# Patient Record
Sex: MALE | Race: WHITE | Employment: FULL TIME | URBAN - METROPOLITAN AREA
[De-identification: names, ages, dates, MRNs, and addresses within clinical notes are randomized per-mention and may not be internally consistent; named-entity substitution may affect disease eponyms.]

---

## 2021-10-07 ENCOUNTER — OFFICE VISIT (OUTPATIENT)
Dept: PRIMARY CARE CLINIC | Age: 56
End: 2021-10-07
Payer: COMMERCIAL

## 2021-10-07 VITALS
DIASTOLIC BLOOD PRESSURE: 84 MMHG | RESPIRATION RATE: 18 BRPM | HEART RATE: 80 BPM | SYSTOLIC BLOOD PRESSURE: 136 MMHG | BODY MASS INDEX: 24.52 KG/M2 | OXYGEN SATURATION: 97 % | WEIGHT: 181 LBS | HEIGHT: 72 IN

## 2021-10-07 DIAGNOSIS — M54.41 CHRONIC MIDLINE LOW BACK PAIN WITH RIGHT-SIDED SCIATICA: Primary | ICD-10-CM

## 2021-10-07 DIAGNOSIS — G89.29 CHRONIC MIDLINE LOW BACK PAIN WITH RIGHT-SIDED SCIATICA: Primary | ICD-10-CM

## 2021-10-07 DIAGNOSIS — Z12.11 COLON CANCER SCREENING: ICD-10-CM

## 2021-10-07 DIAGNOSIS — Z00.00 ANNUAL PHYSICAL EXAM: ICD-10-CM

## 2021-10-07 PROCEDURE — 99202 OFFICE O/P NEW SF 15 MIN: CPT | Performed by: STUDENT IN AN ORGANIZED HEALTH CARE EDUCATION/TRAINING PROGRAM

## 2021-10-07 RX ORDER — PREDNISONE 20 MG/1
20 TABLET ORAL DAILY
Qty: 5 TABLET | Refills: 0 | Status: SHIPPED | OUTPATIENT
Start: 2021-10-07 | End: 2021-10-12

## 2021-10-07 RX ORDER — LIDOCAINE 50 MG/G
1 PATCH TOPICAL DAILY
Qty: 30 PATCH | Refills: 0 | Status: SHIPPED | OUTPATIENT
Start: 2021-10-07 | End: 2021-11-06

## 2021-10-07 SDOH — ECONOMIC STABILITY: FOOD INSECURITY: WITHIN THE PAST 12 MONTHS, THE FOOD YOU BOUGHT JUST DIDN'T LAST AND YOU DIDN'T HAVE MONEY TO GET MORE.: NEVER TRUE

## 2021-10-07 SDOH — ECONOMIC STABILITY: FOOD INSECURITY: WITHIN THE PAST 12 MONTHS, YOU WORRIED THAT YOUR FOOD WOULD RUN OUT BEFORE YOU GOT MONEY TO BUY MORE.: NEVER TRUE

## 2021-10-07 ASSESSMENT — PATIENT HEALTH QUESTIONNAIRE - PHQ9
SUM OF ALL RESPONSES TO PHQ QUESTIONS 1-9: 1
2. FEELING DOWN, DEPRESSED OR HOPELESS: 1
1. LITTLE INTEREST OR PLEASURE IN DOING THINGS: 0
SUM OF ALL RESPONSES TO PHQ9 QUESTIONS 1 & 2: 1
SUM OF ALL RESPONSES TO PHQ QUESTIONS 1-9: 1
SUM OF ALL RESPONSES TO PHQ QUESTIONS 1-9: 1

## 2021-10-07 ASSESSMENT — ENCOUNTER SYMPTOMS
COUGH: 0
ABDOMINAL DISTENTION: 0
SHORTNESS OF BREATH: 0
EYE DISCHARGE: 0
ABDOMINAL PAIN: 0
RHINORRHEA: 0
BACK PAIN: 1
WHEEZING: 0
EYE ITCHING: 0

## 2021-10-07 ASSESSMENT — SOCIAL DETERMINANTS OF HEALTH (SDOH): HOW HARD IS IT FOR YOU TO PAY FOR THE VERY BASICS LIKE FOOD, HOUSING, MEDICAL CARE, AND HEATING?: NOT HARD AT ALL

## 2021-10-07 NOTE — PROGRESS NOTES
516 \[facility] [place] [facility]\"" PRIMARY CARE  [facility] [address] [facility] [address]  [address] [address]  Dept: [phone]  Dept Fax: [phone]    Betzy Gil is a 63 y.o. male who presents today for his medical conditions/complaints as noted below. Chief Complaint   Patient presents with    New Patient    Leg Pain     right        HPI:     63 y. o. M presented to office today to establish care. His main concern was chronic lower back pain that has gotten worse now and radiates from his buttock to right leg. He denied any numbness tingling, recent trauma, recent URI, weakness and change in bowel bladder habits. He mentioned that he had a back surgery done many years ago after which his symptoms were better. He denied any other chronic medical issues. No other current complaints. Vital signs stable. He is a current everyday smoker. He declined low-dose CT lung screening for lung cancer. Advised lifestyle changes. Medications reviewed and refilled as appropriate, problem list updated. All concerns discussed in details and all questions answered to patient satisfaction. No results found for: LABA1C          ( goal A1C is < 7)   No results found for: LABMICR  No results found for: LDLCHOLESTEROL, LDLCALC    (goal LDL is <100)   No results found for: AST, ALT, BUN  BP Readings from Last 3 Encounters:   10/07/21 136/84          (goal 120/80)    History reviewed. No pertinent past medical history. Past Surgical History:   Procedure Laterality Date    ANTERIOR CRUCIATE LIGAMENT REPAIR Right     SPINAL FUSION         History reviewed. No pertinent family history.     Social History     Tobacco Use    Smoking status: Current Every Day Smoker     Packs/day: 1.00     Years: 40.00     Pack years: 40.00     Types: Cigarettes    Smokeless tobacco: Never Used   Substance Use Topics    Alcohol use: Yes     Comment: occaional       Current Outpatient Medications Medication Sig Dispense Refill    predniSONE (DELTASONE) 20 MG tablet Take 1 tablet by mouth daily for 5 days 5 tablet 0    lidocaine (LIDODERM) 5 % Place 1 patch onto the skin daily 12 hours on, 12 hours off. 30 patch 0     No current facility-administered medications for this visit. No Known Allergies    Health Maintenance   Topic Date Due    Hepatitis C screen  Never done    HIV screen  Never done    DTaP/Tdap/Td vaccine (1 - Tdap) Never done    Lipid screen  Never done    Colon cancer screen colonoscopy  Never done    Shingles Vaccine (1 of 2) Never done    Low dose CT lung screening  Never done    Flu vaccine (1) 10/07/2022 (Originally 9/1/2021)    COVID-19 Vaccine  Completed    Hepatitis A vaccine  Aged Out    Hepatitis B vaccine  Aged Out    Hib vaccine  Aged Out    Meningococcal (ACWY) vaccine  Aged Out    Pneumococcal 0-64 years Vaccine  Aged Out       Subjective:      Review of Systems   Constitutional: Negative for chills, fatigue and fever. HENT: Negative for congestion, hearing loss and rhinorrhea. Eyes: Negative for discharge and itching. Respiratory: Negative for cough, shortness of breath and wheezing. Cardiovascular: Negative for chest pain, palpitations and leg swelling. Gastrointestinal: Negative for abdominal distention and abdominal pain. Endocrine: Negative for polydipsia and polyphagia. Genitourinary: Negative for difficulty urinating and dysuria. Musculoskeletal: Positive for back pain. Negative for arthralgias. Skin: Negative for rash and wound. Neurological: Negative for dizziness, seizures, light-headedness, numbness and headaches. Psychiatric/Behavioral: Negative for agitation and behavioral problems. Objective:     Physical Exam  Constitutional:       Appearance: He is well-developed. HENT:      Head: Normocephalic and atraumatic.    Eyes:      Conjunctiva/sclera: Conjunctivae normal.      Pupils: Pupils are equal, round, and reactive to light. Neck:      Thyroid: No thyromegaly. Vascular: No JVD. Cardiovascular:      Rate and Rhythm: Normal rate and regular rhythm. Heart sounds: Normal heart sounds. No murmur heard. Pulmonary:      Effort: Pulmonary effort is normal.      Breath sounds: Normal breath sounds. No wheezing. Abdominal:      General: Bowel sounds are normal. There is no distension. Palpations: Abdomen is soft. Tenderness: There is no abdominal tenderness. There is no rebound. Musculoskeletal:         General: Tenderness present. Cervical back: Normal range of motion and neck supple. Comments: Lower midline back tenderness present. Restricted range of motion present. Neurological:      Mental Status: He is alert and oriented to person, place, and time. Cranial Nerves: No cranial nerve deficit. Psychiatric:         Behavior: Behavior normal.       /84   Pulse 80   Resp 18   Ht 6' (1.829 m)   Wt 181 lb (82.1 kg)   SpO2 97%   BMI 24.55 kg/m²     Assessment:          1. Chronic midline low back pain with right-sided sciatica    - MRI LUMBAR SPINE W CONTRAST; Future  - predniSONE (DELTASONE) 20 MG tablet; Take 1 tablet by mouth daily for 5 days  Dispense: 5 tablet; Refill: 0  - lidocaine (LIDODERM) 5 %; Place 1 patch onto the skin daily 12 hours on, 12 hours off. Dispense: 30 patch; Refill: 0    2. Annual physical exam    - CBC Auto Differential; Future  - Comprehensive Metabolic Panel; Future  - Hemoglobin A1C; Future  - TSH With Reflex Ft4; Future  - Vitamin D 25 Hydroxy; Future  - Vitamin B12 & Folate; Future  - Lipid Panel    3. Colon cancer screening    - Cologuard (For External Results Only); Future            Diagnosis Orders   1. Chronic midline low back pain with right-sided sciatica  MRI LUMBAR SPINE W CONTRAST    predniSONE (DELTASONE) 20 MG tablet    lidocaine (LIDODERM) 5 %   2.  Annual physical exam  CBC Auto Differential    Comprehensive Metabolic Panel Hemoglobin A1C    TSH With Reflex Ft4    Vitamin D 25 Hydroxy    Vitamin B12 & Folate    Lipid Panel   3. Colon cancer screening  Cologuard (For External Results Only)           Plan:      Return in about 6 months (around 4/7/2022). Orders Placed This Encounter   Procedures    Cologuard (For External Results Only)     This test is performed by an external laboratory and is used for result attachment only. It is required that this order requisition be faxed to: [employer] @ [phone]. See [email or web address] for further information. Standing Status:   Future     Standing Expiration Date:   10/7/2022    MRI LUMBAR SPINE W CONTRAST     Standing Status:   Future     Standing Expiration Date:   10/7/2022     Order Specific Question:   Reason for exam:     Answer:   Chronic midline low back pain with right-sided sciatica    CBC Auto Differential     Standing Status:   Future     Standing Expiration Date:   10/7/2022    Comprehensive Metabolic Panel     Standing Status:   Future     Standing Expiration Date:   10/7/2022    Hemoglobin A1C     Standing Status:   Future     Standing Expiration Date:   10/7/2022    TSH With Reflex Ft4     Standing Status:   Future     Standing Expiration Date:   10/7/2022    Vitamin D 25 Hydroxy     Standing Status:   Future     Standing Expiration Date:   10/7/2022    Vitamin B12 & Folate     Standing Status:   Future     Standing Expiration Date:   10/7/2022    Lipid Panel     Order Specific Question:   Is Patient Fasting?/# of Hours     Answer: Fast 8-10 hours     Orders Placed This Encounter   Medications    predniSONE (DELTASONE) 20 MG tablet     Sig: Take 1 tablet by mouth daily for 5 days     Dispense:  5 tablet     Refill:  0    lidocaine (LIDODERM) 5 %     Sig: Place 1 patch onto the skin daily 12 hours on, 12 hours off. Dispense:  30 patch     Refill:  0         Patient given educational materials - see patient instructions.   Discussed use, benefit, and side effects of prescribedmedications. All patient questions answered. Pt voiced understanding. Reviewed health maintenance. Instructed to continue current medications, diet and exercise. Patient agreed with treatment plan. Follow up as directed. I spent a total of 15-29 minutes face to face with this patient. Over 50% of that time was spent on counseling and care coordination. Please see assessment and plan for details. Electronically signed by   Alejandra Day MD on 10/7/2021 at 3:26 PM  [facility]. Please note that this chart was generated using voice recognition Dragon dictation software. Although every effort was made to ensure the accuracy of this automatedtranscription, some errors in transcription may have occurred.

## 2021-10-08 ENCOUNTER — HOSPITAL ENCOUNTER (OUTPATIENT)
Age: 56
Discharge: HOME | End: 2021-10-08
Payer: COMMERCIAL

## 2021-10-08 DIAGNOSIS — Z00.00 ANNUAL PHYSICAL EXAM: ICD-10-CM

## 2021-10-08 LAB
ABSOLUTE EOS #: 0 K/UL (ref 0–0.4)
ABSOLUTE IMMATURE GRANULOCYTE: ABNORMAL K/UL (ref 0–0.3)
ABSOLUTE LYMPH #: 1.8 K/UL (ref 1–4.8)
ABSOLUTE MONO #: 0.6 K/UL (ref 0.1–1.3)
ALBUMIN SERPL-MCNC: 4.2 G/DL (ref 3.5–5.2)
ALBUMIN/GLOBULIN RATIO: ABNORMAL (ref 1–2.5)
ALP BLD-CCNC: 47 U/L (ref 40–129)
ALT SERPL-CCNC: 12 U/L (ref 5–41)
ANION GAP SERPL CALCULATED.3IONS-SCNC: 12 MMOL/L (ref 9–17)
AST SERPL-CCNC: 8 U/L
BASOPHILS # BLD: 0 % (ref 0–2)
BASOPHILS ABSOLUTE: 0 K/UL (ref 0–0.2)
BILIRUB SERPL-MCNC: 0.48 MG/DL (ref 0.3–1.2)
BUN BLDV-MCNC: 18 MG/DL (ref 6–20)
BUN/CREAT BLD: ABNORMAL (ref 9–20)
CALCIUM SERPL-MCNC: 9 MG/DL (ref 8.6–10.4)
CHLORIDE BLD-SCNC: 102 MMOL/L (ref 98–107)
CO2: 24 MMOL/L (ref 20–31)
CREAT SERPL-MCNC: 0.69 MG/DL (ref 0.7–1.2)
DIFFERENTIAL TYPE: ABNORMAL
EOSINOPHILS RELATIVE PERCENT: 0 % (ref 0–4)
ESTIMATED AVERAGE GLUCOSE: 88 MG/DL
FOLATE: 6.3 NG/ML
GFR AFRICAN AMERICAN: >60 ML/MIN
GFR NON-AFRICAN AMERICAN: >60 ML/MIN
GFR SERPL CREATININE-BSD FRML MDRD: ABNORMAL ML/MIN/{1.73_M2}
GFR SERPL CREATININE-BSD FRML MDRD: ABNORMAL ML/MIN/{1.73_M2}
GLUCOSE BLD-MCNC: 106 MG/DL (ref 70–99)
HBA1C MFR BLD: 4.7 % (ref 4–6)
HCT VFR BLD CALC: 44.8 % (ref 41–53)
HEMOGLOBIN: 15.7 G/DL (ref 13.5–17.5)
IMMATURE GRANULOCYTES: ABNORMAL %
LYMPHOCYTES # BLD: 22 % (ref 24–44)
MCH RBC QN AUTO: 34.6 PG (ref 26–34)
MCHC RBC AUTO-ENTMCNC: 35.1 G/DL (ref 31–37)
MCV RBC AUTO: 98.7 FL (ref 80–100)
MONOCYTES # BLD: 7 % (ref 1–7)
NRBC AUTOMATED: ABNORMAL PER 100 WBC
PDW BLD-RTO: 13.1 % (ref 11.5–14.9)
PLATELET # BLD: 303 K/UL (ref 150–450)
PLATELET ESTIMATE: ABNORMAL
PMV BLD AUTO: 7.1 FL (ref 6–12)
POTASSIUM SERPL-SCNC: 3.8 MMOL/L (ref 3.7–5.3)
RBC # BLD: 4.54 M/UL (ref 4.5–5.9)
RBC # BLD: ABNORMAL 10*6/UL
SEG NEUTROPHILS: 71 % (ref 36–66)
SEGMENTED NEUTROPHILS ABSOLUTE COUNT: 5.8 K/UL (ref 1.3–9.1)
SODIUM BLD-SCNC: 138 MMOL/L (ref 135–144)
TOTAL PROTEIN: 6.8 G/DL (ref 6.4–8.3)
TSH SERPL DL<=0.05 MIU/L-ACNC: 0.89 MIU/L (ref 0.3–5)
VITAMIN B-12: 871 PG/ML (ref 232–1245)
VITAMIN D 25-HYDROXY: 30.5 NG/ML (ref 30–100)
WBC # BLD: 8.2 K/UL (ref 3.5–11)
WBC # BLD: ABNORMAL 10*3/UL

## 2021-10-08 PROCEDURE — 82746 ASSAY OF FOLIC ACID SERUM: CPT

## 2021-10-08 PROCEDURE — 82306 VITAMIN D 25 HYDROXY: CPT

## 2021-10-08 PROCEDURE — 85025 COMPLETE CBC W/AUTO DIFF WBC: CPT

## 2021-10-08 PROCEDURE — 80053 COMPREHEN METABOLIC PANEL: CPT

## 2021-10-08 PROCEDURE — 83036 HEMOGLOBIN GLYCOSYLATED A1C: CPT

## 2021-10-08 PROCEDURE — 84443 ASSAY THYROID STIM HORMONE: CPT

## 2021-10-08 PROCEDURE — 82607 VITAMIN B-12: CPT

## 2021-10-08 PROCEDURE — 36415 COLL VENOUS BLD VENIPUNCTURE: CPT

## 2021-10-12 ENCOUNTER — TELEPHONE (OUTPATIENT)
Dept: PRIMARY CARE CLINIC | Age: 56
End: 2021-10-12

## 2021-10-12 DIAGNOSIS — M54.41 CHRONIC MIDLINE LOW BACK PAIN WITH RIGHT-SIDED SCIATICA: Primary | ICD-10-CM

## 2021-10-12 DIAGNOSIS — G89.29 CHRONIC MIDLINE LOW BACK PAIN WITH RIGHT-SIDED SCIATICA: Primary | ICD-10-CM

## 2021-10-12 NOTE — TELEPHONE ENCOUNTER
I would appreciate if they can put the order in and pended for me to avoid further confusion and release.   Thank you

## 2021-10-12 NOTE — TELEPHONE ENCOUNTER
----- Message from Kirill Mendez sent at 10/11/2021  4:33 PM EDT -----  Subject: Message to Provider    QUESTIONS  Information for Provider? Order for lumbar CT scan needs to be rewritten   according to scheduling. Needs to be written for [employer]log[employer]. Please contact   pt.   ---------------------------------------------------------------------------  --------------  CALL BACK INFO  What is the best way for the office to contact you? OK to leave message on   voicemail  Preferred Call Back Phone Number? [phone]  ---------------------------------------------------------------------------  --------------  SCRIPT ANSWERS  Relationship to Patient?  Self

## 2021-10-12 NOTE — TELEPHONE ENCOUNTER
I called and spoke with the [facility] Scheduling (As they're the closer facility for the pt to schedule) and they stated going forward when an order is placed with contrast it should also list \"With and/or WO Contrast\" per the radiologist preference; in order to compare the imaging results. This should eliminate any difficulties placing and scheduling diagnostic imaging with contrast going forward. As soon at the new order is placed, we can then proceed forward with scheduling the pt. MRI.

## 2021-10-26 ENCOUNTER — HOSPITAL ENCOUNTER (OUTPATIENT)
Dept: MRI IMAGING | Age: 56
Discharge: HOME | End: 2021-10-28
Payer: COMMERCIAL

## 2021-10-26 DIAGNOSIS — M54.41 CHRONIC MIDLINE LOW BACK PAIN WITH RIGHT-SIDED SCIATICA: ICD-10-CM

## 2021-10-26 DIAGNOSIS — G89.29 CHRONIC MIDLINE LOW BACK PAIN WITH RIGHT-SIDED SCIATICA: ICD-10-CM

## 2021-10-26 PROCEDURE — 6360000004 HC RX CONTRAST MEDICATION: Performed by: STUDENT IN AN ORGANIZED HEALTH CARE EDUCATION/TRAINING PROGRAM

## 2021-10-26 PROCEDURE — A9579 GAD-BASE MR CONTRAST NOS,1ML: HCPCS | Performed by: STUDENT IN AN ORGANIZED HEALTH CARE EDUCATION/TRAINING PROGRAM

## 2021-10-26 PROCEDURE — 2580000003 HC RX 258: Performed by: STUDENT IN AN ORGANIZED HEALTH CARE EDUCATION/TRAINING PROGRAM

## 2021-10-26 PROCEDURE — 72158 MRI LUMBAR SPINE W/O & W/DYE: CPT

## 2021-10-26 RX ORDER — SODIUM CHLORIDE 0.9 % (FLUSH) 0.9 %
10 SYRINGE (ML) INJECTION PRN
Status: DISCONTINUED | OUTPATIENT
Start: 2021-10-26 | End: 2021-10-29 | Stop reason: HOSPADM

## 2021-10-26 RX ADMIN — GADOTERIDOL 18 ML: 279.3 INJECTION, SOLUTION INTRAVENOUS at 14:53

## 2021-10-26 RX ADMIN — SODIUM CHLORIDE, PRESERVATIVE FREE 10 ML: 5 INJECTION INTRAVENOUS at 14:53

## 2021-10-27 DIAGNOSIS — M54.41 CHRONIC MIDLINE LOW BACK PAIN WITH RIGHT-SIDED SCIATICA: Primary | ICD-10-CM

## 2021-10-27 DIAGNOSIS — G89.29 CHRONIC MIDLINE LOW BACK PAIN WITH RIGHT-SIDED SCIATICA: Primary | ICD-10-CM

## 2021-10-27 NOTE — RESULT ENCOUNTER NOTE
Please notify patient his MRI of lumbar spine showed Multilevel degenerative disc disease and showed mild foraminal narrowing at multiple levels.

## 2021-12-02 ENCOUNTER — OFFICE VISIT (OUTPATIENT)
Dept: NEUROSURGERY | Age: 56
End: 2021-12-02
Payer: COMMERCIAL

## 2021-12-02 VITALS
BODY MASS INDEX: 24.52 KG/M2 | WEIGHT: 181 LBS | OXYGEN SATURATION: 95 % | HEART RATE: 85 BPM | SYSTOLIC BLOOD PRESSURE: 112 MMHG | DIASTOLIC BLOOD PRESSURE: 76 MMHG | HEIGHT: 72 IN

## 2021-12-02 DIAGNOSIS — M54.17 LUMBOSACRAL RADICULOPATHY AT S1: ICD-10-CM

## 2021-12-02 DIAGNOSIS — M54.16 LUMBAR RADICULOPATHY: Primary | ICD-10-CM

## 2021-12-02 PROCEDURE — 99203 OFFICE O/P NEW LOW 30 MIN: CPT | Performed by: NURSE PRACTITIONER

## 2021-12-02 RX ORDER — PREDNISONE 20 MG/1
TABLET ORAL
Qty: 30 TABLET | Refills: 0 | Status: SHIPPED | OUTPATIENT
Start: 2021-12-02 | End: 2021-12-17

## 2021-12-02 NOTE — PROGRESS NOTES
[place] [id number] [address] [address] [address] [facility] [address]  Dept: [phone]    Patient:  Madiha Velasquez  YOB: 1965  Date: 12/2/21    The patient is a 61 y.o. male who presents today for consult of the following problems:     Chief Complaint   Patient presents with    New Patient    Back Pain     low         HPI:     Madiha Velasquez is a 61 y.o. male on whom neurosurgical consultation was requested by Pelon Serrato MD for management of back and leg pain. Pt has had history of back issues with surgery in the past. Did have a fusion? around 2010. Around 6 months ago, developed persistent posterior right leg pain, ranging from 6-8/10 on average. Pain travels down approximate S1 distribution, described as a painful burning sensation. Has been attempting home stretching exercises, utilizing heat, ice as well as a TENS unit with no significant relief from symptoms. Did have a short, 5-day course of prednisone with no significant improvement. Pain has been severe, interfering with daily activities. Patient and wife report significant decline in quality of living secondary to pain. Denies any saddle anesthesia, loss of bowel or bladder function. Groin pain: No  Saddle anesthesia: No  Hip Pain: No  Bowel/bladder incontinence: No  Constipation or Urinary retention: No    LIMITATIONS    Pain significantly limiting from a constant standpoint. Assistive devices: None  Daily pharmacologic pain control include: N/A  Back versus leg: All leg    MANAGEMENT     Prior Surgery: Yes-approximate 2010; L1-L2  Prior to 1yr ago: In the last year:    Physical Therapy: No   Chiropractic Interventions: No   Injections: No  Improvement: N/A    Types of injections/responses: N/A    History:     History reviewed. No pertinent past medical history.   Past Surgical History:   Procedure Laterality Date    ANTERIOR CRUCIATE LIGAMENT REPAIR Right     SPINAL FUSION       History reviewed. No pertinent family history. No current outpatient medications on file prior to visit. No current facility-administered medications on file prior to visit. Social History     Tobacco Use    Smoking status: Current Every Day Smoker     Packs/day: 1.00     Years: 40.00     Pack years: 40.00     Types: Cigarettes    Smokeless tobacco: Never Used   Substance Use Topics    Alcohol use: Yes     Comment: occaional     Drug use: Not on file       No Known Allergies    Review of Systems  Constitutional: Negative for activity change and appetite change. HENT: Negative for ear pain and facial swelling. Eyes: Negative for discharge and itching. Respiratory: Negative for choking and chest tightness. Cardiovascular: Negative for chest pain and leg swelling. Gastrointestinal: Negative for nausea and abdominal pain. Endocrine: Negative for cold intolerance and heat intolerance. Genitourinary: Negative for frequency and flank pain. Musculoskeletal: Negative for myalgias and joint swelling. Skin: Negative for rash and wound. Allergic/Immunologic: Negative for environmental allergies and food allergies. Hematological: Negative for adenopathy. Does not bruise/bleed easily. Psychiatric/Behavioral: Negative for self-injury. The patient is not nervous/anxious. Physical Exam:      /76   Pulse 85   Ht 6' (1.829 m)   Wt 181 lb (82.1 kg)   SpO2 95%   BMI 24.55 kg/m²   Estimated body mass index is 24.55 kg/m² as calculated from the following:    Height as of this encounter: 6' (1.829 m). Weight as of this encounter: 181 lb (82.1 kg). General:  Kellee Hall is a 61y.o. year old male who appears his stated age. HEENT: Normocephalic atraumatic. Neck supple. Chest: regular rate; pulses equal  Abdomen: Soft nontender nondistended.    Ext: DP and PT pulses 2+, good cap refill  Neuro    Mentation  Appropriate affect  Registration intact  Orientation intact    Cranial Nerves:   Pupils equal and reactive to light  Extraocular motion intact  Face and shrug symmetric  Tongue midline  No dysarthria  v1-3 sensation symmetric, masseter tone symmetric  Hearing symmetric and intact    Sensation: Paresthesias to approximate S1 distribution of right leg    Motor  L deltoid 5/5; R deltoid 5/5  L biceps 5/5; R biceps 5/5  L triceps 5/5; R triceps 5/5  L wrist extension 5/5; R wrist extension 5/5  L intrinsics 5/5; R intrinsics 5/5     L iliopsoas 5/5 , R iliopsoas 5/5  L quadriceps 5/5; R quadriceps 5/5  L Dorsiflexion 5/5; R dorsiflexion 5/5  L Plantarflexion 5/5; R plantarflexion 5/5  L EHL 5/5; R EHL 5/5    Reflexes  L Brachioradialis 2+/4; R brachioradialis 2+/4  L Biceps 2+/4; R Biceps 2+/4  L Triceps 2+/4; R Triceps 2+/4  L Patellar 2+/4: R Patellar 2+/4  L Achilles 2+/4; R Achilles 2+/4    hoffmans L: neg  hoffmans R: neg  Clonus L: neg  Clonus R: neg  Babinski L: neg  Babinski R: neg    MICHAEL: Negative  Straight leg raise: Positive right side  SI joint tenderness: Denies change in visual acuity    Studies Review:     MRI lumbar spine 10/26/2021 (images reviewed):   FINDINGS:   BONES/ALIGNMENT: The vertebral body heights are maintained.  There is   age-appropriate bone marrow signal. Rentaa Rehana is multilevel degenerative disc   disease with loss of disc signal.  There is artificial disc material at the   L1-2 level without complication.  There is mild disc space narrowing in the   lower lumbar spine.  There is minimal degenerative anterolisthesis of L4 on   L5.       SPINAL CORD:  The conus is normal in caliber and signal and terminates at   T12.  The cauda equina is unremarkable.       SOFT TISSUES: The posterior paraspinal soft tissues are unremarkable.  The   visualized abdominal structures are unremarkable.       L1-L2: There is artificial disc material.  There is no canal stenosis or   foraminal narrowing.       L2-L3: There is a circumferential disc bulge with facet hypertrophy. Garner Rdz   is no canal stenosis.  There is mild bilateral foraminal narrowing.       L3-L4: There is a circumferential disc bulge with facet and ligamentous   hypertrophy.  There is no canal stenosis.  There is mild left and moderate   right foraminal narrowing.       L4-L5: There is a circumferential disc bulge with facet and ligamentous   hypertrophy.  There is no canal stenosis.  There is mild bilateral foraminal   narrowing.       L5-S1: There is a circumferential disc bulge with facet hypertrophy. Garner Rdz   is a small superimposed right paracentral disc protrusion.  There is no canal   stenosis or foraminal narrowing.  There is narrowing of the right lateral   recess with disc material abutting the descending right S1 nerve root. PCP notes reviewed    Assessment and Plan:      1. Lumbar radiculopathy    2. Lumbosacral radiculopathy at S1          Plan: Patient presents with approximately 6-month history consistent with right S1 radiculopathy, this does correspond with MRI findings. No motor deficits present on exam.  Recommend trial prednisone taper, as well as attempt at physical therapy. Patient is concerned that therapy will worsen his symptoms as all of the exercises he has tried to do at home has, but is willing to try. Referral provided for pain management for consideration of right S1 transforaminal SCOTT for both therapeutic and diagnostic purposes. We will see the patient back in 8-10 weeks for reevaluation, advised to monitor for any new or worsening symptoms particularly any weakness. Followup: Return in about 10 weeks (around 2/10/2022), or if symptoms worsen or fail to improve.     Prescriptions Ordered:  Orders Placed This Encounter   Medications    predniSONE (DELTASONE) 20 MG tablet     Sig: Take 3 tablets PO daily for first 5 days, then take 2 tablets PO daily for next 5 days, then take 1 tablet PO daily for last 5 days     Dispense: 30 tablet     Refill:  0        Orders Placed:  Orders Placed This Encounter   Procedures   Lata Gutierrez MD, Pain Management, [facility]     Referral Priority:   Routine     Referral Type:   Eval and Treat     Referral Reason:   Specialty Services Required     Referred to Provider:   Brent Encinas MD     Requested Specialty:   Pain Management     Number of Visits Requested:   [address]     Referral Priority:   Routine     Referral Type:   Eval and Treat     Referral Reason:   Specialty Services Required     Requested Specialty:   Physical Therapy     Number of Visits Requested:   1        Electronically signed by JENNIFER Butts CNP on 12/2/2021 at 5:24 PM    Please note that this chart was generated using voice recognition Dragon dictation software. Although every effort was made to ensure the accuracy of this automated transcription, some errors in transcription may have occurred.

## 2021-12-02 NOTE — PROGRESS NOTES
Department of Neurosurgery                                                 New patient clinic note      Reason for Consult:  Chronic midline low back pain with right-sided sciatica  Requesting Physician:  Lacie Massey MD  Neurosurgeon: Kailey Lora DO      History Obtained From:  Patient. CHIEF COMPLAINT:         Chronic midline low back pain with right-sided sciatica    HISTORY OF PRESENT ILLNESS:       The patient is a 61 y.o. male who presents for consult for chronic midline low back pain with right-sided sciatica. The patient complains of    PAST MEDICAL HISTORY :       Past Medical History:    No past medical history on file. Past Surgical History:        Procedure Laterality Date    ANTERIOR CRUCIATE LIGAMENT REPAIR Right     SPINAL FUSION         Social History:   Social History     Socioeconomic History    Marital status:      Spouse name: Not on file    Number of children: Not on file    Years of education: Not on file    Highest education level: Not on file   Occupational History    Not on file   Tobacco Use    Smoking status: Current Every Day Smoker     Packs/day: 1.00     Years: 40.00     Pack years: 40.00     Types: Cigarettes    Smokeless tobacco: Never Used   Substance and Sexual Activity    Alcohol use: Yes     Comment: occaional     Drug use: Not on file    Sexual activity: Not on file   Other Topics Concern    Not on file   Social History Narrative    Not on file     Social Determinants of Health     Financial Resource Strain: Low Risk     Difficulty of Paying Living Expenses: Not hard at all   Food Insecurity: No Food Insecurity    Worried About Running Out of Food in the Last Year: Never true    Girish of Food in the Last Year: Never true   Transportation Needs:     Lack of Transportation (Medical): Not on file    Lack of Transportation (Non-Medical):  Not on file   Physical Activity:     Days of Exercise per Week: Not on file    Minutes of Exercise per Session: Not on file   Stress:     Feeling of Stress : Not on file   Social Connections:     Frequency of Communication with Friends and Family: Not on file    Frequency of Social Gatherings with Friends and Family: Not on file    Attends Yazidi Services: Not on file    Active Member of Clubs or Organizations: Not on file    Attends Club or Organization Meetings: Not on file    Marital Status: Not on file   Intimate Partner Violence:     Fear of Current or Ex-Partner: Not on file    Emotionally Abused: Not on file    Physically Abused: Not on file    Sexually Abused: Not on file   Housing Stability:     Unable to Pay for Housing in the Last Year: Not on file    Number of [place] in the Last Year: Not on file    Unstable Housing in the Last Year: Not on file       Family History:   No family history on file. Allergies:  Patient has no known allergies. Home Medications:  Prior to Admission medications    Not on File       Current Medications:   No current facility-administered medications for this visit. REVIEW OF SYSTEMS:       CONSTITUTIONAL: negative for fatigue and malaise   EYES: negative for double vision and photophobia    HEENT: negative for tinnitus and sore throat   RESPIRATORY: negative for cough, shortness of breath   CARDIOVASCULAR: negative for chest pain, palpitations   GASTROINTESTINAL: negative for nausea, vomiting   GENITOURINARY: negative for incontinence   MUSCULOSKELETAL: negative for neck or back pain   NEUROLOGICAL: negative for seizures   PSYCHIATRIC: negative for agitated     Review of systems otherwise negative. PHYSICAL EXAM:       There were no vitals taken for this visit.     Gen: NAD, comfortable  HEENT: moist mucus membranes  Cardio: RRR  Pulm: chest rise symmetrically  GI: abd soft  Ext: no edema  Skin: warm    Neuro:    AOX3  CN 2-12 grossly intact  Speech articulate  Motor 5/5  Sensation symmetrical   No macias or clonus    LABS AND IMAGING:     CBC with Differential:    Lab Results   Component Value Date    WBC 8.2 10/08/2021    RBC 4.54 10/08/2021    HGB 15.7 10/08/2021    HCT 44.8 10/08/2021     10/08/2021    MCV 98.7 10/08/2021    MCH 34.6 10/08/2021    MCHC 35.1 10/08/2021    RDW 13.1 10/08/2021    LYMPHOPCT 22 10/08/2021    MONOPCT 7 10/08/2021    BASOPCT 0 10/08/2021    MONOSABS 0.60 10/08/2021    LYMPHSABS 1.80 10/08/2021    EOSABS 0.00 10/08/2021    BASOSABS 0.00 10/08/2021    DIFFTYPE NOT REPORTED 10/08/2021     BMP:    Lab Results   Component Value Date     10/08/2021    K 3.8 10/08/2021     10/08/2021    CO2 24 10/08/2021    BUN 18 10/08/2021    LABALBU 4.2 10/08/2021    CREATININE 0.69 10/08/2021    CALCIUM 9.0 10/08/2021    GFRAA >60 10/08/2021    LABGLOM >60 10/08/2021    GLUCOSE 106 10/08/2021       Radiology Review:    MRI lumbar W WO contrast  10/26/2021  Official read:  Multilevel degenerative disc disease with associated facet and ligamentous  hypertrophy with a small right paracentral disc protrusion at L5-S1.  Disc  material narrows the right lateral recess and compresses the descending right  S1 nerve root. Bilateral foraminal narrowing as described above. My read:      ASSESSMENT AND PLAN:     There is no problem list on file for this patient. {There are no diagnoses linked to this encounter. (Refresh or delete this SmartLink)}     A/P:  This is a 61 y.o. male with {No diagnosis found. (Refresh or delete this SmartLink)}        I showed the patient the imagings and explained the diagnosis and the various treatment options      By signing my name below, I, Ritesh Abrams, attest that this documentation has been prepared under the direction and in the presence of José Luis Jefferson DO. Electronically signed: Ange Rodriguez, 12/2/21     Marley LARIOSam, personally performed the services described in this documentation.  All medical record entries made by the scribe were at my direction and in my presence. I have reviewed the chart and discharge instructions and agree that the records reflect my personal performance and is accurate and complete. Kendrick Pal DO. Board certified neurosurgeon 12/2/21       This note was created using voice recognition software. There may be inaccuracies of transcription  that are inadvertently overlooked prior to the signature. There is any questions about the transcription please contact me.

## 2021-12-06 ENCOUNTER — TELEPHONE (OUTPATIENT)
Dept: PAIN MANAGEMENT | Age: 56
End: 2021-12-06

## 2021-12-06 NOTE — TELEPHONE ENCOUNTER
Writer attempted to schedule new consult. Voicemail message left. Per Dr. Jace Poon interventions only/ no narcotics.

## 2021-12-07 ENCOUNTER — TELEPHONE (OUTPATIENT)
Dept: PAIN MANAGEMENT | Age: 56
End: 2021-12-07

## 2021-12-14 ENCOUNTER — HOSPITAL ENCOUNTER (OUTPATIENT)
Dept: PHYSICAL THERAPY | Age: 56
Setting detail: THERAPIES SERIES
Discharge: HOME | End: 2021-12-14
Payer: COMMERCIAL

## 2021-12-14 PROCEDURE — 97110 THERAPEUTIC EXERCISES: CPT

## 2021-12-14 PROCEDURE — 97162 PT EVAL MOD COMPLEX 30 MIN: CPT

## 2021-12-14 NOTE — CONSULTS
[x] [facility]) - [facility]  [address]  [place]: [phone]   F: [phone]     Physical Therapy Lumbar Evaluation    Date:  2021  Patient: Bobbi Parmar  : 1965  MRN: [id number]  Physician: JENNIFER Copeland CNP   Insurance: Padmaja Colbert 10/10 visits remain (no aquatics)  Medical Diagnosis:   M54.16 (ICD-10-CM) - Lumbar radiculopathy   M54.17 (ICD-10-CM) - Lumbosacral radiculopathy at S1   Rehab Codes: R25 pain , M25.60 stiffness  Onset Date: chronic   Next 's appt: 22      Subjective:   Pt provides limited subjective stating the information should all be in the chart. Pt expresses frustration all through the appointment about seeing multiple providers and just wants to have surgery to fix this issue. States he does not believe PT will be able to help him. Throughout subjective he sits leaning L to keep weight off R side. Pt states he has a chronic hx of LBP that began in his 25s. Lasted into his 35s & he had a lumbar L1-L2 fusion in . Unsure if he had rehab following. He notes he has not gotten in to pain management yet but is scheduled 22. States the low back pain has worsened in the last 6 months. Has radiating pain down posterior RLE to the foot. This limits his tolerance to all functional tasks. Feels best in a\"pretzel\" position with TENS and heat pack on the low back but this does not fully subside the pain. Takes ibruprofen as much as stomach can manage. Also taking steroids in which he is finishing in a few days.      PMHx: [] Unremarkable [] Diabetes [] HTN  [] Pacemaker   [] MI/Heart Problems [] Cancer [] Arthritis [] Other:              [x] Refer to full medical chart  In EPIC     Comorbidities:   [] Obesity [] Dialysis  [x] Other: L1-L2 spinal surgery    [] Asthma/COPD [] Dementia [] Other:   [] Stroke [] Sleep apnea [] Other:   [] Vascular disease [] Rheumatic disease [] Other:     Preferred Language:   [x] English           [] Other:    Prior Imaging:   Impression   Multilevel degenerative disc disease with associated facet and ligamentous   hypertrophy with a small right paracentral disc protrusion at L5-S1.  Disc   material narrows the right lateral recess and compresses the descending right   S1 nerve root.       Bilateral foraminal narrowing as described above. Previous Treatment: has tried TENS, steroid, heat/ice, and home stretches with no relief. 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 Only experiencing severe back pain with function. Patient Goals/Rehab Expectations: to fulfill the referral        Objective:      OBSERVATION No Deficit Deficit Not Tested Comments   Forward Head [] [] []    Rounded Shoulders [] [] []    Kyphosis [x] [] []    Lordosis [] [x] [] Increased in lumbar region   Scoliosis [x] [] []    Innominate Alignment [] [] [x]    NEUROLOGICAL       Reflexes [] [] [x]    Compression/Distraction [] [x] [] Increases pain, really guarded    Sensation [] [x] [] Shooting pain; pins & needles reported in foot    FALL RISK?  [x] []     Comments:   - sits without pressure on R side   - finds most relief with R knee to chest position   - tends to prefer a stooped position with RLE down on knee and LLE flexed towards chest         Range of Motion  Left Range of Motion  Right Strength  Left Strength  Right   Lumbar Flexion Limited       Lumbar Extension Most limited       Lumbar Rotation Increases pain Increases pain     Lumbar Side Bend Did not attempt Did not attempt     Hip Flexion   4+ 4+    Hip Abduction   5 5   Hip Adduction   5 5   Hip Extension   4 4   Hip ER WNL  WNL      Hip IR WNL  WNL      Knee Flexion   5 5   Knee Extension   5 5   Dorsiflexion   5 5   Plantar flexion   5 5       LUMBAR/SIJ SPECIAL TESTS Left Right   Standing Flexion + []         - [x]           NT []  + [x]         - []           NT []   Increases symptoms-- feels pull in low back   Repeated Flexion + []         - []           NT []  + []         - [x]           NT []   Increase of pain    Repeated Extension + []         - []           NT []  POSITIVE  Feels like a stretch, mild decrease in symptoms   SLR + []         - [x]           NT []  + [x]         - []           NT []    Slump Test + []         - [x]           NT []  + []         - [x]           NT []    Prone Instability Test + []         - []           NT [x]  + []         - []           NT [x]    Anterior Gapping + []         - []           NT []  + []         - [x]           NT []    Posterior Gapping + []         - []           NT []  + []         - [x]           NT []    Sacral Thrust + []         - []           NT [x]  + []         - []           NT [x]    Thigh Thrust + []         - []           NT [x]  + []         - []           NT [x]    Gaenslen's + []         - []           NT [x]  + []         - []           NT [x]    Other:  + []         - []           NT []  + []         - []           NT []      HIP SPECIAL TESTS Left Right   MICHAEL + []         - [x]           NT []  + []         - [x]           NT []    FADIR + []         - [x]           NT []  + []         - [x]           NT []    Scour + []         - []           NT []  + []         - []           NT [] exercises this date. Discussed lumbar protection strategies to prevent undue stress to spine. Pt wishes to complete HEP on own and then agrees to follow up if needed after his appointment with his referring provider in Jan 2022. Feel he would benefit from PT to help with pain modulation but a obvious barrier is patient buy-in which could limit progression with PT services. Problems:    [x] ? Pain:  [x] ? ROM:  [x] ? Strength:  [x] ? Function:  [] Other:    STG/LTG: (to be met in 10 treatments)  1. Pt will self report worst pain no greater than 4/10 in order to better tolerate ADLs/work activities with minimal dysfunction  2. Pt will demonstrate independence with a long term HEP for continued progress/maintenance after completion of PT.  3. Pt will demonstrate awareness of lumbar protection strategies with bed mobility, bending/reaching, and lifting to reduce strain to spine and complete ADLs with more stability, reducing dysfunction. 4. Pt will be able to tolerate sitting a mid-line posture for >10 minutes showing improved sitting tolerance. 5. Pt will report a centralization of symptoms with extension based interventions to reduce the effect radicular symptoms has on functional activities. Functional Assessment Used: Mod.  NANDO -- will collect in future session if appropriate, not able to collect as pt frustrated with being at PT and only wanting to complete limited visits     Evaluation Complexity:  History (Personal factors, comorbidities) [] 0 [x] 1-2 [] 3+   Exam (limitations, restrictions) [] 1-2 [x] 3 [] 4+   Clinical presentation (progression) [] Stable [x] Evolving  [] Unstable   Decision Making [] Low [x] Moderate [] High    [] Low Complexity [x] Moderate Complexity [] High Complexity     Rehab Potential:  [] Good  [x] Fair  [] Poor   Suggested Professional Referral:  [x] No  [] Yes:  Barriers to Goal Achievement[de-identified]  [] No  [x] Yes: limited buy in to PT services   Domestic Concerns:  [x] No  [] Yes: Pt. Education:  [x] Plans/Goals, Risks/Benefits discussed  [x] Home exercise program  Method of Education: [x] Verbal  [x] Demo  [x] Written  Comprehension of Education:  [x] Verbalizes understanding. [x] Demonstrates understanding. [] Needs Review. [x] Demonstrates/verbalizes understanding of HEP/Ed previously given. Treatment Plan:  [x] Therapeutic Exercise   19979  [] Iontophoresis: 4 mg/mL Dexamethasone Sodium Phosphate  mAmin  13931   [x] Therapeutic Activity  70237 [] Vasopneumatic cold with compression  45051    [] Gait Training   62792 [] Ultrasound   65562   [x] Neuromuscular Re-education  57829 [] Electrical Stimulation Unattended  40799   [x] Manual Therapy  58525 [] Electrical Stimulation Attended  74825   [x] Instruction in HEP  [] Lumbar/Cervical Traction  26011   [] Aquatic Therapy   31745 [] Cold/hotpack    [] Massage   66690      [] Dry Needling, 1 or 2 muscles  63990   [] Biofeedback, first 15 minutes   75893  [] Biofeedback, additional 15 minutes   61472 [] Dry Needling, 3 or more muscles  57242     []  Medication allergies reviewed for use of    Dexamethasone Sodium Phosphate 4mg/ml     with iontophoresis treatments. Pt is not allergic. Frequency: 1-2 x/week for 10 visits recommended by PT. Patient does not want to do this plan but will follow up after visit in Jan 2022 with referring MD if needed. Pt aware to contact this clinic if needed. Todays Treatment:  Educated on  Home program consisting of standing lumbar extensions completed frequently, standing pelvic tilt to neutral against wall, SKTC stretch (pt already doing at home), and trAB marching.  Provided with handout and patient notes understanding     Specific Instructions for next treatment: follow up on HEP in Feb 2022; see if buy in increases     Treatment Charges: Mins Units   [x] Evaluation       []  Low       [x]  Moderate       []  High 35 1   []  Modalities     [x]  Ther Exercise 8 1   []  Manual Therapy     []  Ther Activities     []  Aquatics     []  Neuromuscular     []  Gait Training     []  Dry Needling           1-2 muscles     []  Dry Needling           3 or more muscles     [] Vasocompression     []  Other       Time in: 4005    Time Out: 1627   TOTAL  TIME: 43     Total billable time: 8 minutes      Electronically signed by: Jennie Castleman, PT

## 2021-12-16 DIAGNOSIS — Z12.11 COLON CANCER SCREENING: ICD-10-CM

## 2021-12-22 ENCOUNTER — PATIENT MESSAGE (OUTPATIENT)
Dept: PRIMARY CARE CLINIC | Age: 56
End: 2021-12-22

## 2021-12-22 DIAGNOSIS — G89.29 CHRONIC MIDLINE LOW BACK PAIN WITH RIGHT-SIDED SCIATICA: Primary | ICD-10-CM

## 2021-12-22 DIAGNOSIS — M54.41 CHRONIC MIDLINE LOW BACK PAIN WITH RIGHT-SIDED SCIATICA: Primary | ICD-10-CM

## 2021-12-22 RX ORDER — GABAPENTIN 100 MG/1
100 CAPSULE ORAL DAILY
Qty: 30 CAPSULE | Refills: 0 | Status: SHIPPED | OUTPATIENT
Start: 2021-12-22 | End: 2022-03-14

## 2021-12-22 RX ORDER — IBUPROFEN 800 MG/1
800 TABLET ORAL EVERY 8 HOURS PRN
Qty: 90 TABLET | Refills: 0 | Status: SHIPPED | OUTPATIENT
Start: 2021-12-22 | End: 2022-09-22

## 2021-12-22 RX ORDER — CYCLOBENZAPRINE HCL 5 MG
5 TABLET ORAL DAILY PRN
Qty: 15 TABLET | Refills: 0 | Status: SHIPPED | OUTPATIENT
Start: 2021-12-22 | End: 2022-01-06

## 2021-12-22 NOTE — TELEPHONE ENCOUNTER
Please look at PT mychart message from [facility] yesterday. Dr. Concetta Wadsworth replied to it.

## 2021-12-22 NOTE — TELEPHONE ENCOUNTER
From: Samantha Mcnulty  Sent: 12/22/2021 10:57 AM EST  To: Beat[facility] Clinical Staff  Subject: Struggling with pain    Dr Tiffanie Ross suggested multiple prescriptions. They would all be new. Are you able to access his reply to me on this steffanie?

## 2022-01-04 ENCOUNTER — HOSPITAL ENCOUNTER (OUTPATIENT)
Dept: PAIN MANAGEMENT | Age: 57
Discharge: HOME | End: 2022-01-04
Payer: COMMERCIAL

## 2022-01-04 VITALS
DIASTOLIC BLOOD PRESSURE: 78 MMHG | OXYGEN SATURATION: 98 % | WEIGHT: 180 LBS | BODY MASS INDEX: 24.38 KG/M2 | TEMPERATURE: 97.8 F | HEIGHT: 72 IN | HEART RATE: 94 BPM | RESPIRATION RATE: 20 BRPM | SYSTOLIC BLOOD PRESSURE: 140 MMHG

## 2022-01-04 DIAGNOSIS — G89.29 CHRONIC BILATERAL LOW BACK PAIN WITH RIGHT-SIDED SCIATICA: Primary | ICD-10-CM

## 2022-01-04 DIAGNOSIS — Z98.1 HISTORY OF LUMBAR FUSION: ICD-10-CM

## 2022-01-04 DIAGNOSIS — M51.26 LUMBAR DISC HERNIATION: Chronic | ICD-10-CM

## 2022-01-04 DIAGNOSIS — M54.41 CHRONIC BILATERAL LOW BACK PAIN WITH RIGHT-SIDED SCIATICA: Primary | ICD-10-CM

## 2022-01-04 PROCEDURE — 99203 OFFICE O/P NEW LOW 30 MIN: CPT

## 2022-01-04 PROCEDURE — 99244 OFF/OP CNSLTJ NEW/EST MOD 40: CPT | Performed by: ANESTHESIOLOGY

## 2022-01-04 ASSESSMENT — ENCOUNTER SYMPTOMS
RHINORRHEA: 1
RESPIRATORY NEGATIVE: 1
BACK PAIN: 1
ALLERGIC/IMMUNOLOGIC NEGATIVE: 1

## 2022-01-04 ASSESSMENT — PAIN DESCRIPTION - PAIN TYPE: TYPE: CHRONIC PAIN

## 2022-01-04 ASSESSMENT — PAIN - FUNCTIONAL ASSESSMENT: PAIN_FUNCTIONAL_ASSESSMENT: PREVENTS OR INTERFERES SOME ACTIVE ACTIVITIES AND ADLS

## 2022-01-04 ASSESSMENT — PAIN DESCRIPTION - LOCATION: LOCATION: BACK;HIP;LEG;FOOT

## 2022-01-04 ASSESSMENT — PAIN DESCRIPTION - PROGRESSION: CLINICAL_PROGRESSION: GRADUALLY WORSENING

## 2022-01-04 ASSESSMENT — PAIN SCALES - GENERAL: PAINLEVEL_OUTOF10: 6

## 2022-01-04 ASSESSMENT — PAIN DESCRIPTION - ORIENTATION: ORIENTATION: RIGHT

## 2022-01-04 ASSESSMENT — PAIN DESCRIPTION - DIRECTION: RADIATING_TOWARDS: RIGHT LEG TO FOOT

## 2022-01-04 ASSESSMENT — PAIN DESCRIPTION - FREQUENCY: FREQUENCY: CONTINUOUS

## 2022-01-04 ASSESSMENT — PAIN DESCRIPTION - ONSET: ONSET: ON-GOING

## 2022-01-04 NOTE — PROGRESS NOTES
[facility]  Patient Pain Assessment  Dr. Mar Tinajero Consultation/ Follow Up     Primary Care Physician: Ashlee Ray MD    Chief complaint:   Chief Complaint   Patient presents with    Back Pain   . HISTORY OF PRESENT ILLNESS:  Grace Schultz is 61 y.o. male with    HPI  71-year-old man with history of chronic back pain  History of lumbar spine fusion surgery 10 years ago  He states he was doing fine until 6 months back when he had sudden flareup of severe pain  Pain located in the lower back with radiation down right leg over gluteal region posterior thigh all the way to the foot  Associated symptoms include right leg numbness  No loss of bladder or bowel control  Pain aggravated lifting bending twisting turning walking  Pain is constant throbbing deep burning sensation  Pain is significantly interfering with work activity level and quality of life  He tried rest home exercise and physical therapy with no significant improvement  Had recent MRI lumbar spine that showed lumbar disc herniation at right-sided L5-S1 with displacement of the descending S1 nerve root  Had recent evaluation by neurosurgery at [facility] and was recommended for epidural injection     No flowsheet data found.     Current Pain Assessment  Pain Assessment  Pain Assessment: 0-10  Pain Level: 6 (increases with prolonged sitting and laying down)  Patient's Stated Pain Goal: 2  Pain Type: Chronic pain  Pain Location: Back,Hip,Leg,Foot  Pain Orientation: Right  Pain Radiating Towards: right leg to foot  Pain Descriptors: Aching,Radiating,Sharp,Shooting,Dull,Throbbing  Pain Frequency: Continuous  Pain Onset: On-going  Clinical Progression: Gradually worsening  Functional Pain Assessment: Prevents or interferes some active activities and ADLs  Non-Pharmaceutical Pain Intervention(s): Repositioned,Cold applied,Heat applied,TENS (standing and ambulating helps improve pain)  Response to Pain Intervention: (Injections approx 10 years ago unsure what they were -- did help bring pain from a 7 to a 5  for approx 1-2 months.)     ADVERSE MEDICATION EFFECTS:   Constipation: no  Bowel Regimen: No:   Diet: common adult  Appetite:  ok  Sedation:  no  Urinary Retention: no    FOCUSED PAIN SCALE:  Highest : 9  Lowest :6  Average: Range-7  When and What  was your last procedure:      Was your procedureeffective:  Yes had injections at [facility] approx 10 years ago but does not remember what type. Did stated it gave aprox 30% relief for 1-2 months. ACTIVITY/SOCIAL/EMOTIONAL:  Sleep Pattern: 8 hours per night.nightime awakenings  Energy Level: Tired/Fatigued  Currently attending Physical Therapy:  Yes -had one session and received home exercises. Pt. Does stretches daily. Chiropractic Treatments: Yes many years ago  Home Exercises: stretches walking  Mobility: yes  Currently seeing a Psychiatristor Psychologist:  No  Emotional Issues: normal   Mood: appropriate     ABERRANT BEHAVIORS SINCE LAST VISIT:  Have you ever been treated in another Pain Clinic yes at [facility] pain clinic over 10 years ago  Refills for prescriptions appropriate: not applicable  Lost rx/pills: not applicable  Taking more medication than prescribed:  not applicable  Are you receiving PAIN medications from  other doctors: yes  Last Urine/Serum Drug Screen :  Was Serum/UDS as anticipated? None recorded will collect if needed  Brought pill bottles in :n/a  Was Pill count appropriate? :not applicable   Are currently pregnant? not applicable  Recent ER visits: No      Previous management history:   Previous diagnostic workup: MRI     Previous Medications tried:  - NSAID's: Yes  - Neurontin: Yes  - Lyrica :No  - Trycyclic antidepressant:(Ellavil / Pamelor):  No   - Cymbalta:No  - Opioids (Ultram / Vicodin / Percocet / Morphine / Dilaudid / Oramorph/ Fentanyl etc.):Yes- years ago with past surgery- percocet did help    BP (!) 140/78   Pulse 94   Temp 97.8 °F (36.6 °C) (Infrared)   Resp 20   Ht 6' (1.829 m)   Wt 180 lb (81.6 kg)   SpO2 98%   BMI 24.41 kg/m²       Past Medical History      Diagnosis Date    Arthritis        Surgical History  Past Surgical History:   Procedure Laterality Date    ANTERIOR CRUCIATE LIGAMENT REPAIR Right     SPINAL FUSION         Medications  Current Outpatient Medications   Medication Sig Dispense Refill    Elastic Bandages & Supports (LUMBAR BACK BRACE/SUPPORT PAD) MISC 1 Units by Does not apply route as needed (pneumatric offloading back brace) 1 each 0    gabapentin (NEURONTIN) 100 MG capsule Take 1 capsule by mouth daily for 30 days. 30 capsule 0    ibuprofen (ADVIL;MOTRIN) 800 MG tablet Take 1 tablet by mouth every 8 hours as needed for Pain 90 tablet 0    cyclobenzaprine (FLEXERIL) 5 MG tablet Take 1 tablet by mouth daily as needed for Muscle spasms 15 tablet 0     No current facility-administered medications for this encounter. Allergies  Adhesive tape    Family History  family history is not on file.       Social History  Social History     Socioeconomic History    Marital status:      Spouse name: None    Number of children: None    Years of education: None    Highest education level: None   Occupational History    None   Tobacco Use    Smoking status: Current Every Day Smoker     Packs/day: 1.00     Years: 40.00     Pack years: 40.00     Types: Cigarettes    Smokeless tobacco: Never Used   Substance and Sexual Activity    Alcohol use: Yes     Comment: occaional     Drug use: Never    Sexual activity: None   Other Topics Concern    None   Social History Narrative    None     Social Determinants of Health     Financial Resource Strain: Low Risk     Difficulty of Paying Living Expenses: Not hard at all   Food Insecurity: No Food Insecurity    Worried About Running Out of Food in the Last Year: Never true    Girish of Food in the Last Year: Never true   Transportation Needs:     Lack of Transportation (Medical): Not on file    Lack of Transportation (Non-Medical): Not on file   Physical Activity:     Days of Exercise per Week: Not on file    Minutes of Exercise per Session: Not on file   Stress:     Feeling of Stress : Not on file   Social Connections:     Frequency of Communication with Friends and Family: Not on file    Frequency of Social Gatherings with Friends and Family: Not on file    Attends Zoroastrian Services: Not on file    Active Member of [address] [place] [address] [employer] or Organizations: Not on file    Attends Club or Organization Meetings: Not on file    Marital Status: Not on file   Intimate Partner Violence:     Fear of Current or Ex-Partner: Not on file    Emotionally Abused: Not on file    Physically Abused: Not on file    Sexually Abused: Not on file   Housing Stability:     Unable to Pay for Housing in the Last Year: Not on file    Number of [place] in the Last Year: Not on file    Unstable Housing in the Last Year: Not on file      reports no history of drug use. REVIEW OF SYSTEMS:  Review of Systems   Constitutional: Negative. Negative for fever. HENT: Positive for rhinorrhea. Chronic sinusitis   Eyes:        Wear's corrective lens   Respiratory: Negative. Cardiovascular: Negative. Gastrointestinal:        Acid reflux   Endocrine: Negative. Genitourinary: Negative. Musculoskeletal: Positive for arthralgias, back pain and myalgias. Allergic/Immunologic: Negative. Neurological: Positive for weakness. Hematological: Bruises/bleeds easily. Psychiatric/Behavioral: Negative. Objective:  General Appearance:  Uncomfortable, in pain, well-appearing and in no acute distress. Vital signs: (most recent): Blood pressure (!) 140/78, pulse 94, temperature 97.8 °F (36.6 °C), temperature source Infrared, resp. rate 20, height 6' (1.829 m), weight 180 lb (81.6 kg), SpO2 98 %. Vital signs are normal.  No fever. Output: Producing urine and producing stool. HEENT: Normal HEENT exam.    Lungs:  Normal effort and normal respiratory rate. Breath sounds clear to auscultation. He is not in respiratory distress. No decreased breath sounds. Heart: Normal rate. Regular rhythm. Extremities: Normal range of motion. There is no deformity. Neurological: Patient is alert and oriented to person, place and time. Normal strength. Patient has normal reflexes, normal muscle tone and normal coordination. Pupils:  Pupils are equal, round, and reactive to light. Pupils are equal.   Skin:  Warm and dry. No rash or cyanosis.      Lumbar spine examination  No apparent deformity on inspection  Range of motion is moderately limited associated with pain particularly with flexion  Positive tenderness to palpation over right lower lumbar area  Straight leg raise positive on right side  Gait is stable    Neurological examination  Normal muscle strength 5/5 in both lower extremities in all major muscle group  Deep tendon reflexes are 2+ and symmetric  Clonus negative    Assessment & Plan   48-year-old man with history of chronic back pain  History of lumbar spine fusion surgery 10 years ago  He states he was doing fine until 6 months back when he had sudden flareup of severe pain  Pain located in the lower back with radiation down right leg over gluteal region posterior thigh all the way to the foot  Associated symptoms include right leg numbness  No loss of bladder or bowel control  Pain aggravated lifting bending twisting turning walking  Pain is constant throbbing deep burning sensation  Pain is significantly interfering with work activity level and quality of life  He tried rest home exercise and physical therapy with no significant improvement  Had recent MRI lumbar spine that showed lumbar disc herniation at right-sided L5-S1 with displacement of the descending S1 nerve root  Had recent evaluation by neurosurgery at [facility] and was recommended for epidural injection  1. Chronic bilateral low back pain with right-sided sciatica    2. History of lumbar fusion    3. Lumbar disc herniation      MRI lumbar spine  Report is reviewed  Images reviewed independently  Images were shown to the patient  Pertinent finding discussed in detail with patient  Right-sided L5-S1 level disc herniation with impingement and displacement of the traversing S1 nerve root    Is presenting with severe back and right lumbar radicular symptom  Unable to tolerate therapy because of severe pain  I agree with neurosurgery recommendation of epidural injection  We will schedule patient for lumbar transforaminal injection  I hope to improve pain control with this intervention so he can better participate in physical therapy for core strengthening    We will also order an offloading lumbar back brace    Consultation note sent to the referring physician    Orders Placed This Encounter   Procedures    UT NJX AA&/STRD TFRML EPI LUMBAR/SACRAL 1 LEVEL     Left L5 AND S1 TRANS FORAMINAL SCOTT     Standing Status:   Future     Standing Expiration Date:   2022      Orders Placed This Encounter   Medications    Elastic Bandages & Supports (LUMBAR BACK BRACE/SUPPORT PAD) MISC     Si Units by Does not apply route as needed (pneumatric offloading back brace)     Dispense:  1 each     Refill:  0          This note was created using voice recognition software. There may be inaccuracies of transcription  that are inadvertently overlooked prior to the signature. There is anyquestions about the transcription please contact me.

## 2022-02-15 ENCOUNTER — TELEPHONE (OUTPATIENT)
Dept: PAIN MANAGEMENT | Age: 57
End: 2022-02-15

## 2022-02-15 NOTE — TELEPHONE ENCOUNTER
Pre procedure call made. No vaccines in the past two weeks. Patient stopped ibuprofen. All questions and concerns answered.

## 2022-02-16 ENCOUNTER — HOSPITAL ENCOUNTER (OUTPATIENT)
Dept: GENERAL RADIOLOGY | Age: 57
Discharge: HOME | End: 2022-02-18
Payer: COMMERCIAL

## 2022-02-16 ENCOUNTER — TELEPHONE (OUTPATIENT)
Dept: PRIMARY CARE CLINIC | Age: 57
End: 2022-02-16

## 2022-02-16 ENCOUNTER — HOSPITAL ENCOUNTER (OUTPATIENT)
Dept: PAIN MANAGEMENT | Age: 57
Discharge: HOME | End: 2022-02-16
Payer: COMMERCIAL

## 2022-02-16 VITALS
SYSTOLIC BLOOD PRESSURE: 128 MMHG | BODY MASS INDEX: 24.38 KG/M2 | HEART RATE: 77 BPM | OXYGEN SATURATION: 96 % | TEMPERATURE: 97.7 F | WEIGHT: 180 LBS | HEIGHT: 72 IN | DIASTOLIC BLOOD PRESSURE: 78 MMHG | RESPIRATION RATE: 18 BRPM

## 2022-02-16 DIAGNOSIS — R52 PAIN MANAGEMENT: ICD-10-CM

## 2022-02-16 DIAGNOSIS — G89.29 CHRONIC BILATERAL LOW BACK PAIN WITH RIGHT-SIDED SCIATICA: Primary | ICD-10-CM

## 2022-02-16 DIAGNOSIS — M54.41 CHRONIC BILATERAL LOW BACK PAIN WITH RIGHT-SIDED SCIATICA: Primary | ICD-10-CM

## 2022-02-16 DIAGNOSIS — M51.26 LUMBAR DISC HERNIATION: Chronic | ICD-10-CM

## 2022-02-16 DIAGNOSIS — Z98.1 HISTORY OF LUMBAR FUSION: ICD-10-CM

## 2022-02-16 PROCEDURE — 64483 NJX AA&/STRD TFRM EPI L/S 1: CPT

## 2022-02-16 PROCEDURE — 6360000004 HC RX CONTRAST MEDICATION: Performed by: ANESTHESIOLOGY

## 2022-02-16 PROCEDURE — 62323 NJX INTERLAMINAR LMBR/SAC: CPT

## 2022-02-16 PROCEDURE — 6360000002 HC RX W HCPCS: Performed by: ANESTHESIOLOGY

## 2022-02-16 PROCEDURE — 64483 NJX AA&/STRD TFRM EPI L/S 1: CPT | Performed by: ANESTHESIOLOGY

## 2022-02-16 PROCEDURE — 3209999900 FLUORO FOR SURGICAL PROCEDURES

## 2022-02-16 PROCEDURE — 64484 NJX AA&/STRD TFRM EPI L/S EA: CPT | Performed by: ANESTHESIOLOGY

## 2022-02-16 PROCEDURE — 99152 MOD SED SAME PHYS/QHP 5/>YRS: CPT | Performed by: ANESTHESIOLOGY

## 2022-02-16 RX ORDER — FENTANYL CITRATE 50 UG/ML
INJECTION, SOLUTION INTRAMUSCULAR; INTRAVENOUS
Status: COMPLETED | OUTPATIENT
Start: 2022-02-16 | End: 2022-02-16

## 2022-02-16 RX ORDER — MIDAZOLAM HYDROCHLORIDE 1 MG/ML
INJECTION INTRAMUSCULAR; INTRAVENOUS
Status: COMPLETED | OUTPATIENT
Start: 2022-02-16 | End: 2022-02-16

## 2022-02-16 RX ADMIN — MIDAZOLAM 2 MG: 1 INJECTION INTRAMUSCULAR; INTRAVENOUS at 08:44

## 2022-02-16 RX ADMIN — IOHEXOL 3 ML: 180 INJECTION INTRAVENOUS at 08:50

## 2022-02-16 RX ADMIN — Medication 50 MCG: at 08:45

## 2022-02-16 ASSESSMENT — PAIN SCALES - GENERAL
PAINLEVEL_OUTOF10: 5
PAINLEVEL_OUTOF10: 5

## 2022-02-16 ASSESSMENT — PAIN - FUNCTIONAL ASSESSMENT
PAIN_FUNCTIONAL_ASSESSMENT: PREVENTS OR INTERFERES SOME ACTIVE ACTIVITIES AND ADLS
PAIN_FUNCTIONAL_ASSESSMENT: 0-10

## 2022-02-16 ASSESSMENT — PAIN DESCRIPTION - DESCRIPTORS: DESCRIPTORS: BURNING;PRESSURE;RADIATING

## 2022-02-16 NOTE — OP NOTE
Patient Name: Juancarlos Ye   YOB: 1965  Room/Bed: Room/bed info not found  Medical Record Number: [id number]  Date: 2/16/2022       Preoperative Diagnosis:    1. Chronic bilateral low back pain with right-sided sciatica    2. History of lumbar fusion    3. Lumbar disc herniation          Postoperative diagnosis:   1. Chronic bilateral low back pain with right-sided sciatica    2. History of lumbar fusion    3. Lumbar disc herniation          Blood Loss: none    Procedure Performed:  Transforaminal lumbar epidural steroid injection at the levels of L5 AND S1 on the Right side under fluoroscopic guidance. Procedure: The Patient was seen in the preop area, chart was reviewed, informed consent was obtained. Patient was taken to procedure room and was placed in prone position. Vital signs were monitored through out the  Procedure. A time out was completed. The skin over the back was prepped and draped in sterile manner. The target point was marked in ipsilateral obliques just below the pedicle at the target levels. Skin and deep tissues were anesthetized with 1 % lidocaine. A 20-gauge, spinal needle was advanced  under fluoroscopy guidance in AP / Oblique and lateral views, such that the tip of the needle lies in the neuroforamina at about the 6 o'clock position under the pedicle of the target vertebra. This was confirmed with AP and lateral views of the fluoroscopy. Then after negative aspiration contrast dye Omnipaque-180 was injected with live fluoroscopy in AP views that showed  spread of the contrast in the epidural space  and no vascular runoff or intrathecal spread. Finally 3 ml of treatment solution containing 5 ml of  PF NS mixed with 1 ml of dexamethasone 10 mg / ml was injected. The needle was removed and a Band-Aid was placed over the needle  insertion site. The patient's vital signs remained stable and the patient tolerated the procedure well.         The same procedure was then repeated at right at L 5level with same technique. The remaining 3 ml of treatment solution was injected at that. The total dose of steroid injected today was dexamethasone 10 mg. Electronically signed by Emmitt Boast, MD on 2/16/2022 at 8:51 AM    SEDATION NOTE:    ASA CLASSIFICATION  2  MP   CLASSIFICATION  2    Moderate intravenous conscious sedation was supervised by Dr. Rasta Brito  The patient was independently monitored by a Registered Nurse assigned to the Procedure Room  Monitoring included automated blood pressure, continuous EKG, Capnography and continuous pulse oximetry. The detailed Conscious Record is permanently stored in the [place] [address].      The following is the conscious sedation record;  Start Time:  1042  End times:  1052  Duration:  10 MINUTES  MEDS GIVEN 2 MG VERSED AND 50 MCG FENTANYL

## 2022-02-16 NOTE — PROGRESS NOTES
Discharge criteria met. Post procedure dressing dry and intact. Sensory and motor function intact as per pre-procedure. Patient alert and oriented x3  Instructions and follow up reviewed with pt at patient at discharge. Discharged home transported by wheelchair, accompanied by family .   Discharged @3481

## 2022-02-16 NOTE — H&P
Pain Pre-Op H&P Note    Arleen Wiseman MD    HPI: Jonny Thakkar  presents with   BACK AND LEG   RIGHT > LEFT  CHRONIC  SEVERE   7/10    Past Medical History:   Diagnosis Date    Arthritis        Past Surgical History:   Procedure Laterality Date    ANTERIOR CRUCIATE LIGAMENT REPAIR Right     SPINAL FUSION         History reviewed. No pertinent family history. Allergies   Allergen Reactions    Adhesive Tape Rash         Current Outpatient Medications:     Elastic Bandages & Supports (LUMBAR BACK BRACE/SUPPORT PAD) MISC, 1 Units by Does not apply route as needed (pneumatric offloading back brace), Disp: 1 each, Rfl: 0    gabapentin (NEURONTIN) 100 MG capsule, Take 1 capsule by mouth daily for 30 days. , Disp: 30 capsule, Rfl: 0    ibuprofen (ADVIL;MOTRIN) 800 MG tablet, Take 1 tablet by mouth every 8 hours as needed for Pain, Disp: 90 tablet, Rfl: 0    Social History     Tobacco Use    Smoking status: Current Every Day Smoker     Packs/day: 1.00     Years: 40.00     Pack years: 40.00     Types: Cigarettes    Smokeless tobacco: Never Used   Substance Use Topics    Alcohol use: Yes     Comment: occasional        Review of Systems:   Focused review of systems was performed, and negative as pertinent to diagnosis, except as stated in HPI. Physical Exam  Constitutional:       Appearance: Normal appearance. Pulmonary:      Effort: Pulmonary effort is normal.   Neurological:      Mental Status: alert. Psychiatric:         Attention and Perception: Attention and perception normal.         Mood and Affect: Mood and affect normal.   Cardiovascular:      Rate: Normal rate.          ASA: 2          Mallampati: 2       Patient's current physical status, medications, medical history, and HPI have been reviewed and updated as appropriate on this date: 02/16/22    Risk/Benefit(s): The risks, benefits, alternatives, and potential complications have been discussed with the patient/family and informed consent has been obtained for the procedure/sedation.     Diagnosis:   LUMBAR RADICULITIS      Plan:   LUMBAR SCOTT        Mary Rodgers MD

## 2022-02-16 NOTE — TELEPHONE ENCOUNTER
----- Message from Vaishali Archuleta sent at 2/16/2022 10:03 AM EST -----  Subject: Message to Provider    QUESTIONS  Information for Provider? Pt had a spine injection this morning and was   instructed not to drive for 24 hours. He was only given one absence note   for today but he needs one for tomorrow. He has to drive 1 hour to get to   work and his shift is from 8am-4pm. That's less than 24 hours since the   procedure. The person who did the procedure only writes notes for the day   of the procedures and told him to call his provider to get another one.   ---------------------------------------------------------------------------  --------------  [address]  What is the best way for the office to contact you? OK to leave message on   voicemail  Preferred Call Back Phone Number? [phone]  ---------------------------------------------------------------------------  --------------  SCRIPT ANSWERS  Relationship to Patient?  Self

## 2022-03-01 NOTE — PRE-PROCEDURE INSTRUCTIONS
Called patient to review pre procedure instructions and reminder of appointment. Chart reviewed along with medication list. Pre procedure instructions given. Patient acknowledges an understanding. Orthopedic

## 2022-03-02 ENCOUNTER — HOSPITAL ENCOUNTER (OUTPATIENT)
Dept: GENERAL RADIOLOGY | Age: 57
Discharge: HOME | End: 2022-03-04
Payer: COMMERCIAL

## 2022-03-02 ENCOUNTER — HOSPITAL ENCOUNTER (OUTPATIENT)
Dept: PAIN MANAGEMENT | Age: 57
Discharge: HOME | End: 2022-03-02
Payer: COMMERCIAL

## 2022-03-02 VITALS
RESPIRATION RATE: 16 BRPM | DIASTOLIC BLOOD PRESSURE: 95 MMHG | HEIGHT: 72 IN | WEIGHT: 180 LBS | TEMPERATURE: 97.3 F | OXYGEN SATURATION: 95 % | BODY MASS INDEX: 24.38 KG/M2 | SYSTOLIC BLOOD PRESSURE: 153 MMHG | HEART RATE: 74 BPM

## 2022-03-02 DIAGNOSIS — M54.41 CHRONIC BILATERAL LOW BACK PAIN WITH RIGHT-SIDED SCIATICA: ICD-10-CM

## 2022-03-02 DIAGNOSIS — R52 PAIN MANAGEMENT: ICD-10-CM

## 2022-03-02 DIAGNOSIS — G89.29 CHRONIC BILATERAL LOW BACK PAIN WITH RIGHT-SIDED SCIATICA: ICD-10-CM

## 2022-03-02 DIAGNOSIS — Z98.1 HISTORY OF LUMBAR FUSION: ICD-10-CM

## 2022-03-02 DIAGNOSIS — M51.26 LUMBAR DISC HERNIATION: Primary | Chronic | ICD-10-CM

## 2022-03-02 PROCEDURE — 6360000004 HC RX CONTRAST MEDICATION: Performed by: ANESTHESIOLOGY

## 2022-03-02 PROCEDURE — 6360000002 HC RX W HCPCS: Performed by: ANESTHESIOLOGY

## 2022-03-02 PROCEDURE — 62323 NJX INTERLAMINAR LMBR/SAC: CPT

## 2022-03-02 PROCEDURE — 2580000003 HC RX 258: Performed by: ANESTHESIOLOGY

## 2022-03-02 PROCEDURE — 99152 MOD SED SAME PHYS/QHP 5/>YRS: CPT | Performed by: ANESTHESIOLOGY

## 2022-03-02 PROCEDURE — 3209999900 FLUORO FOR SURGICAL PROCEDURES

## 2022-03-02 PROCEDURE — 62323 NJX INTERLAMINAR LMBR/SAC: CPT | Performed by: ANESTHESIOLOGY

## 2022-03-02 PROCEDURE — 2500000003 HC RX 250 WO HCPCS: Performed by: ANESTHESIOLOGY

## 2022-03-02 RX ORDER — FENTANYL CITRATE 50 UG/ML
INJECTION, SOLUTION INTRAMUSCULAR; INTRAVENOUS
Status: COMPLETED | OUTPATIENT
Start: 2022-03-02 | End: 2022-03-02

## 2022-03-02 RX ORDER — MIDAZOLAM HYDROCHLORIDE 1 MG/ML
INJECTION INTRAMUSCULAR; INTRAVENOUS
Status: COMPLETED | OUTPATIENT
Start: 2022-03-02 | End: 2022-03-02

## 2022-03-02 RX ORDER — LIDOCAINE HYDROCHLORIDE 10 MG/ML
INJECTION, SOLUTION EPIDURAL; INFILTRATION; INTRACAUDAL; PERINEURAL
Status: COMPLETED | OUTPATIENT
Start: 2022-03-02 | End: 2022-03-02

## 2022-03-02 RX ORDER — DEXAMETHASONE SODIUM PHOSPHATE 10 MG/ML
INJECTION, SOLUTION INTRAMUSCULAR; INTRAVENOUS
Status: COMPLETED | OUTPATIENT
Start: 2022-03-02 | End: 2022-03-02

## 2022-03-02 RX ORDER — SODIUM CHLORIDE 0.9 % (FLUSH) 0.9 %
SYRINGE (ML) INJECTION
Status: COMPLETED | OUTPATIENT
Start: 2022-03-02 | End: 2022-03-02

## 2022-03-02 RX ADMIN — Medication 50 MCG: at 07:55

## 2022-03-02 RX ADMIN — IOHEXOL 1 ML: 180 INJECTION INTRAVENOUS at 07:58

## 2022-03-02 RX ADMIN — MIDAZOLAM 2 MG: 1 INJECTION INTRAMUSCULAR; INTRAVENOUS at 07:56

## 2022-03-02 RX ADMIN — LIDOCAINE HYDROCHLORIDE 5 ML: 10 INJECTION, SOLUTION EPIDURAL; INFILTRATION; INTRACAUDAL; PERINEURAL at 07:56

## 2022-03-02 RX ADMIN — Medication 10 ML: at 07:55

## 2022-03-02 RX ADMIN — DEXAMETHASONE SODIUM PHOSPHATE 10 MG: 10 INJECTION, SOLUTION INTRAMUSCULAR; INTRAVENOUS at 07:59

## 2022-03-02 ASSESSMENT — PAIN DESCRIPTION - DESCRIPTORS: DESCRIPTORS: BURNING;PRESSURE;RADIATING

## 2022-03-02 ASSESSMENT — PAIN - FUNCTIONAL ASSESSMENT
PAIN_FUNCTIONAL_ASSESSMENT: 0-10
PAIN_FUNCTIONAL_ASSESSMENT: PREVENTS OR INTERFERES SOME ACTIVE ACTIVITIES AND ADLS
PAIN_FUNCTIONAL_ASSESSMENT: 0-10

## 2022-03-02 ASSESSMENT — PAIN SCALES - GENERAL
PAINLEVEL_OUTOF10: 0
PAINLEVEL_OUTOF10: 0

## 2022-03-02 NOTE — OP NOTE
Patient Name: Abida Sparrow   YOB: 1965  Room/Bed: Room/bed info not found  Medical Record Number: [id number]  Date: 3/2/2022       Sedation/ Anesthesia Plan:   intravenous sedation   as needed. Medications Planned:   midazolam (Versed) / Fentanyl  Intravenously  as needed. Preoperative Diagnosis:    1. Lumbar disc herniation    2. History of lumbar fusion    3. Chronic bilateral low back pain with right-sided sciatica        Postoperative Diagnosis:    1. Lumbar disc herniation    2. History of lumbar fusion    3. Chronic bilateral low back pain with right-sided sciatica        Procedure Performed:  Lumbar epidural steroid injection under fluoroscopy guidance    Blood Loss: None    Procedure: The Patient was seen in the preop area, chart was reviewed, informed consent was obtained. Patient was taken to procedure room and was placed in prone position. Vital signs were monitored through out the  Procedure. A time out was completed. The skin over the back was prepped and draped in sterile manner. The target point was marked at The interlaminar space at L5/S1 . Skin and deep tissues were anesthetized with 1 % lidocaine. A 20-gauge Tuohy epidural needlele was advanced  under fluoroscopy guidance in AP view. Epidural space was identified using CINTHYA technique. Position ws confirmed in Lateral view. Then after negative aspiration contrast dye Omnipaque-180 was injected with live fluoroscopy in AP views that showed  spread of the contrast in the epidural space  and no vascular runoff or intrathecal spread. Finally 5 ml of treatment solution containing 4 ml of PF NS and 1 ml of DEXAMETHASONE 10 mg / ml was injected. The needle was removed and a Band-Aid was placed over the needle  insertion site. The patient's vital signs remained stable and the patient tolerated the procedure well.       Electronically signed by Nick Dinero MD on 3/2/2022 at 8:02 AM     SEDATION NOTE:    ASA CLASSIFICATION  2  MP   CLASSIFICATION  2    Moderate intravenous conscious sedation was supervised by Dr. Sherlene Essex  The patient was independently monitored by a Registered Nurse assigned to the Procedure Room  Monitoring included automated blood pressure, continuous EKG, Capnography and continuous pulse oximetry. The detailed Conscious Record is permanently stored in the [place] [address].      The following is the conscious sedation record;  Start Time:  0752  End times:  0802  Duration:  10 MINUTES  MEDS GIVEN 2 MG VERSED AND 50 MCG FENTANYL

## 2022-03-02 NOTE — H&P
Pain Pre-Op H&P Note    Hayes Meraz MD    HPI: Darcy Gacsa  presents with   Back and leg pain  Right > left    Past Medical History:   Diagnosis Date    Arthritis        Past Surgical History:   Procedure Laterality Date    ANTERIOR CRUCIATE LIGAMENT REPAIR Right     SPINAL FUSION         No family history on file. Allergies   Allergen Reactions    Adhesive Tape Rash         Current Outpatient Medications:     Elastic Bandages & Supports (LUMBAR BACK BRACE/SUPPORT PAD) MISC, 1 Units by Does not apply route as needed (pneumatric offloading back brace), Disp: 1 each, Rfl: 0    gabapentin (NEURONTIN) 100 MG capsule, Take 1 capsule by mouth daily for 30 days. , Disp: 30 capsule, Rfl: 0    ibuprofen (ADVIL;MOTRIN) 800 MG tablet, Take 1 tablet by mouth every 8 hours as needed for Pain, Disp: 90 tablet, Rfl: 0    Social History     Tobacco Use    Smoking status: Current Every Day Smoker     Packs/day: 1.00     Years: 40.00     Pack years: 40.00     Types: Cigarettes    Smokeless tobacco: Never Used   Substance Use Topics    Alcohol use: Yes     Comment: occasional        Review of Systems:   Focused review of systems was performed, and negative as pertinent to diagnosis, except as stated in HPI. Physical Exam  Constitutional:       Appearance: Normal appearance. Pulmonary:      Effort: Pulmonary effort is normal.   Neurological:      Mental Status: alert. Psychiatric:         Attention and Perception: Attention and perception normal.         Mood and Affect: Mood and affect normal.   Cardiovascular:      Rate: Normal rate.        ASA: 2          Mallampati: 3       Patient's current physical status, medications, medical history, and HPI have been reviewed and updated as appropriate on this date: 03/02/22    Risk/Benefit(s): The risks, benefits, alternatives, and potential complications have been discussed with the patient/family and informed consent has been obtained for the procedure/sedation. Diagnosis:   1. Lumbar disc herniation    2. History of lumbar fusion    3.  Chronic bilateral low back pain with right-sided sciatica        Plan:   Lumbar fawad        Tona De Jesus MD

## 2022-03-15 NOTE — PROGRESS NOTES
Ruth 88 PROGRESS NOTE      Patient  completed []  video visit   []   phone call:         Minutes :   [x] in person visit to pain clinic    []    to  review Medication Agreement    [x]  Follow up after procedure   []  Discuss treatment options      Location:  Provider:  working from    []    home    [x]   [facility] - ADARSH SHRESTHA ,   patient at   [x] pain clinic     []  home         Chief Complaint: low back pain    He had Lumbar epidural steroid injection L5-S1 on 3-2-22 and he obtained. He states 90% relief from left leg pain. He has history of chronic back pain and had  lumbar fusion. in the past, He had right transforaminal lumbar epidural injectionL5, S1 on 2-16-22. He had lumbar MRI done 10-26-21 which read as \" multilevel degenerative disc diseae with associated facet and ligamentous hypertrophy\" He reports he sleeps well. He does exercises for his back. Back Pain  This is a chronic problem. The current episode started more than 1 year ago. The problem occurs constantly. The problem has been gradually improving since onset. The pain is present in the lumbar spine. The quality of the pain is described as aching (dull. throbbing , burning). The pain does not radiate. The pain is at a severity of 2/10. The pain is mild. The pain is the same all the time. The symptoms are aggravated by bending. He has tried ice and heat for the symptoms. The treatment provided mild relief.              Treatment goals: keep pain where it isat   Functional status:   Aberrancy:   Any alcoholic beverages    yes per social history       Any illegal drugs   no      Analgesia:      2               Adverse  Effects :n/a      ADL;s : able to exercise    Data:    When was thelast UDS:   Not applicable         Was the UDS appropriate:  [] yes []   no      Record/Diagnostics Review:      As above, I did review the imaging       EXAMINATION:   MRI OF THE LUMBAR SPINE WITHOUT AND WITH CONTRAST  10/26/2021 2:01 pm     TECHNIQUE:   Multiplanar multisequence MRI of the lumbar spine was performed without and   with the administration of intravenous contrast.       COMPARISON:   None.       HISTORY:   ORDERING SYSTEM PROVIDED HISTORY: Chronic midline low back pain with   right-sided sciatica   TECHNOLOGIST PROVIDED HISTORY:   Chronic midline low back pain with right-sided sciatica   Reason for Exam: Chronic midline low back pain with right-sided sciatica   Additional signs and symptoms: previous fusion 15n years ago.  Lumbar pain   and pain down back of thighs for the past  6 months       FINDINGS:   BONES/ALIGNMENT: The vertebral body heights are maintained.  There is   age-appropriate bone marrow signal.  There is multilevel degenerative disc   disease with loss of disc signal.  There is artificial disc material at the   L1-2 level without complication.  There is mild disc space narrowing in the   lower lumbar spine.  There is minimal degenerative anterolisthesis of L4 on   L5.       SPINAL CORD:  The conus is normal in caliber and signal and terminates at   T12.  The cauda equina is unremarkable.       SOFT TISSUES: The posterior paraspinal soft tissues are unremarkable.  The   visualized abdominal structures are unremarkable.       L1-L2: There is artificial disc material.  There is no canal stenosis or   foraminal narrowing.       L2-L3: There is a circumferential disc bulge with facet hypertrophy.  There   is no canal stenosis.  There is mild bilateral foraminal narrowing.       L3-L4: There is a circumferential disc bulge with facet and ligamentous   hypertrophy.  There is no canal stenosis.  There is mild left and moderate   right foraminal narrowing.       L4-L5: There is a circumferential disc bulge with facet and ligamentous   hypertrophy.  There is no canal stenosis.  There is mild bilateral foraminal   narrowing.       L5-S1: There is a circumferential disc bulge with facet hypertrophy. Day Lea   is a small superimposed right paracentral disc protrusion.  There is no canal   stenosis or foraminal narrowing.  There is narrowing of the right lateral   recess with disc material abutting the descending right S1 nerve root.           Impression   Multilevel degenerative disc disease with associated facet and ligamentous   hypertrophy with a small right paracentral disc protrusion at L5-S1.  Disc   material narrows the right lateral recess and compresses the descending right   S1 nerve root.       Bilateral foraminal narrowing as described above.                       Pill count: appropriate    fill date : not applicable    Morphine equivalent dose as reported on OARRS:     Review ofOARRS does not show any aberrant prescription behavior. Medication is helping the patient stay active. Patient denies any side effects and reports adequate analgesia. No sign of misuse/abuse. Past Medical History:   Diagnosis Date    Arthritis        Past Surgical History:   Procedure Laterality Date    ANTERIOR CRUCIATE LIGAMENT REPAIR Right     SPINAL FUSION         Allergies   Allergen Reactions    Adhesive Tape Rash         Current Outpatient Medications:     ibuprofen (ADVIL;MOTRIN) 800 MG tablet, Take 1 tablet by mouth every 8 hours as needed for Pain, Disp: 90 tablet, Rfl: 0    History reviewed. No pertinent family history.     Social History     Socioeconomic History    Marital status:      Spouse name: Not on file    Number of children: Not on file    Years of education: Not on file    Highest education level: Not on file   Occupational History    Not on file   Tobacco Use    Smoking status: Current Every Day Smoker     Packs/day: 1.00     Years: 40.00     Pack years: 40.00     Types: Cigarettes    Smokeless tobacco: Never Used   Substance and Sexual Activity    Alcohol use: Yes     Comment: occasional     Drug use: Yes     Types: Marijuana [place]on Piper)    Sexual activity: Not on file   Other Topics Concern    Not on file Social History Narrative    Not on file     Social Determinants of Health     Financial Resource Strain: Low Risk     Difficulty of Paying Living Expenses: Not hard at all   Food Insecurity: No Food Insecurity    Worried About Running Out of Food in the Last Year: Never true    [address] in the Last Year: Never true   Transportation Needs:     Lack of Transportation (Medical): Not on file    Lack of Transportation (Non-Medical): Not on file   Physical Activity:     Days of Exercise per Week: Not on file    Minutes of Exercise per Session: Not on file   Stress:     Feeling of Stress : Not on file   Social Connections:     Frequency of Communication with Friends and Family: Not on file    Frequency of Social Gatherings with Friends and Family: Not on file    Attends Baptism Services: Not on file    Active Member of [address] [place] [address] or Organizations: Not on file    Attends Club or Organization Meetings: Not on file    Marital Status: Not on file   Intimate Partner Violence:     Fear of Current or Ex-Partner: Not on file    Emotionally Abused: Not on file    Physically Abused: Not on file    Sexually Abused: Not on file   Housing Stability:     Unable to Pay for Housing in the Last Year: Not on file    Number of [place] in the Last Year: Not on file    Unstable Housing in the Last Year: Not on file         Review of Systems:  Review of Systems   Constitutional: Negative. HENT: Negative. Eyes:        Glasses   Cardiovascular: Negative. Respiratory: Negative. Endocrine: Negative. Hematologic/Lymphatic: Negative. Skin: Negative. Musculoskeletal: Positive for back pain and joint pain. Gastrointestinal: Negative. Genitourinary: Negative. Neurological: Negative. Psychiatric/Behavioral: Negative.           Physical Exam:  BP (!) 139/90   Pulse 67   Temp 97.8 °F (36.6 °C) (Infrared)   Resp 18   Ht 6' (1.829 m)   Wt 180 lb (81.6 kg)   SpO2 97%   BMI 24.41 kg/m² Physical Exam  HENT:      Head: Normocephalic. Pulmonary:      Effort: Pulmonary effort is normal.   Skin:         Neurological:      Mental Status: He is alert and oriented to person, place, and time. Gait: Gait is intact. Deep Tendon Reflexes:      Reflex Scores:       Patellar reflexes are 2+ on the right side and 2+ on the left side. Achilles reflexes are 2+ on the right side. Psychiatric:         Mood and Affect: Mood normal.         Thought Content: Thought content normal.           Assessment:    Problem List Items Addressed This Visit     Lumbar disc herniation - Primary (Chronic)    History of lumbar fusion    Chronic bilateral low back pain with right-sided sciatica              Treatment Plan:  DISCUSSION: Treatment options discussed withpatient and all questions answered to patient's satisfaction. Possible side effects, risk of tolerance and or dependence and alternative treatments discussed    Obtaining appropriate analgesic effect of treatment   No signs of potential drug abuse or diversion identified    [x] Ill effects of being on chronic pain medications such as sleep disturbances, respiratory depression, hormonal changes, withdrawal symptoms, chronic opioid dependence and tolerance as well as risk of taking opioids with Benzodiazepines and taking opioids along with alcohol,  werediscussed with patient. I had asked the patient to minimize medication use and utilize pain medications only for uncontrolled rest pain or pain with exertional activities. I advised patient not to self-escalate painmedications without consulting with us. At each of patient's future visits we will try to taper pain medications, while adjusting the adjunct medications, and re-evaluating for Physical Therapy to improve spinal andjoint strength. We will continue to have discussions to decrease pain medications as tolerated.       Counseled patient on effects their pain medication and /or their medical

## 2022-03-16 ENCOUNTER — HOSPITAL ENCOUNTER (OUTPATIENT)
Dept: PAIN MANAGEMENT | Age: 57
Discharge: HOME | End: 2022-03-16
Payer: COMMERCIAL

## 2022-03-16 VITALS
BODY MASS INDEX: 24.38 KG/M2 | HEIGHT: 72 IN | RESPIRATION RATE: 18 BRPM | DIASTOLIC BLOOD PRESSURE: 90 MMHG | SYSTOLIC BLOOD PRESSURE: 139 MMHG | WEIGHT: 180 LBS | HEART RATE: 67 BPM | OXYGEN SATURATION: 97 % | TEMPERATURE: 97.8 F

## 2022-03-16 DIAGNOSIS — G89.29 CHRONIC BILATERAL LOW BACK PAIN WITH RIGHT-SIDED SCIATICA: ICD-10-CM

## 2022-03-16 DIAGNOSIS — M54.41 CHRONIC BILATERAL LOW BACK PAIN WITH RIGHT-SIDED SCIATICA: ICD-10-CM

## 2022-03-16 DIAGNOSIS — Z98.1 HISTORY OF LUMBAR FUSION: ICD-10-CM

## 2022-03-16 DIAGNOSIS — M51.26 LUMBAR DISC HERNIATION: Primary | Chronic | ICD-10-CM

## 2022-03-16 PROCEDURE — 99213 OFFICE O/P EST LOW 20 MIN: CPT | Performed by: NURSE PRACTITIONER

## 2022-03-16 PROCEDURE — 99213 OFFICE O/P EST LOW 20 MIN: CPT

## 2022-03-16 ASSESSMENT — ENCOUNTER SYMPTOMS
BACK PAIN: 1
RESPIRATORY NEGATIVE: 1
GASTROINTESTINAL NEGATIVE: 1

## 2022-03-16 ASSESSMENT — PAIN SCALES - GENERAL: PAINLEVEL_OUTOF10: 2

## 2022-04-18 ENCOUNTER — CLINICAL DOCUMENTATION (OUTPATIENT)
Dept: PHYSICAL THERAPY | Age: 57
End: 2022-04-18

## 2022-04-18 NOTE — DISCHARGE SUMMARY
[] [facility] @ [facility]  [address] [place], [address] [facility]  Phone [phone]  Fax [phone]    Physical Therapy Discharge Note    Date: 2022      Patient: Anselmo Sevilla  : 1965  MRN: [id number]    Physician: JENNIFER Magallon - [place]                                 Insurance: [employer] 10/10 visits remain (no aquatics)  Medical Diagnosis:   M54.16 (ICD-10-CM) - Lumbar radiculopathy   M54.17 (ICD-10-CM) - Lumbosacral radiculopathy at S1   Rehab Codes: R25 pain , M25.60 stiffness  Onset Date: chronic              Next 's appt: 22  Date of initial visit: 21                   Pt completed PT evaluation visit only on 21. He cancelled all his follow up visits. Will close this episode of care at this time. It Is My Understanding That The:  [x] Other: unknown current status due to no follow ups     Treatment Included:- evaluation only with education on home program        If you have any questions or concerns regarding this patient's care, please contact us.    Thank you for your referral.      Electronically signed by: Ivan Arboleda PT

## 2022-05-11 ENCOUNTER — TELEMEDICINE (OUTPATIENT)
Dept: PRIMARY CARE CLINIC | Age: 57
End: 2022-05-11
Payer: COMMERCIAL

## 2022-05-11 DIAGNOSIS — F41.9 ANXIETY: Primary | ICD-10-CM

## 2022-05-11 PROCEDURE — 99213 OFFICE O/P EST LOW 20 MIN: CPT | Performed by: STUDENT IN AN ORGANIZED HEALTH CARE EDUCATION/TRAINING PROGRAM

## 2022-05-11 RX ORDER — BUSPIRONE HYDROCHLORIDE 5 MG/1
5 TABLET ORAL 2 TIMES DAILY
Qty: 60 TABLET | Refills: 0 | Status: SHIPPED | OUTPATIENT
Start: 2022-05-11 | End: 2022-06-10

## 2022-05-11 RX ORDER — AMOXICILLIN 500 MG/1
CAPSULE ORAL
COMMUNITY
Start: 2022-05-03 | End: 2022-09-22

## 2022-05-11 RX ORDER — HYDROCODONE BITARTRATE AND ACETAMINOPHEN 5; 325 MG/1; MG/1
TABLET ORAL
COMMUNITY
Start: 2022-05-03 | End: 2022-09-22

## 2022-05-11 ASSESSMENT — ENCOUNTER SYMPTOMS
FACIAL SWELLING: 0
COUGH: 0

## 2022-05-11 NOTE — PROGRESS NOTES
2022    TELEHEALTH EVALUATION -- Audio/Visual (During BIOLV-94 public health emergency)    HPI:    Aggie Gerard (:  1965) has requested an audio/video evaluation for the following concern(s): Anxiety  Patient mentioned that he got new dentures recently and has been very anxious about it. He feels he is gagging all the time and cannot relax. Denied any signs symptoms of depression. Review of Systems   Constitutional: Negative for fever. HENT: Negative for facial swelling and mouth sores. Respiratory: Negative for cough. Psychiatric/Behavioral: Negative for self-injury, sleep disturbance and suicidal ideas. The patient is nervous/anxious. Prior to Visit Medications    Medication Sig Taking?  Authorizing Provider   busPIRone (BUSPAR) 5 MG tablet Take 1 tablet by mouth 2 times daily Yes Evelio Lawrence MD   amoxicillin (AMOXIL) 500 MG capsule TAKE 1 CAPSULE BY MOUTH EVERY EIGHT HOURS UNTIL GONE  Historical Provider, MD   HYDROcodone-acetaminophen (Janeth Lars) 5-325 MG per tablet TAKE 1 TABLET BY MOUTH EVERY 4 TO 6 HOURS AS NEEDED FOR PAIN, DO NOT EXCEED 8 TABLETS IN 24 HOURS  Historical Provider, MD   ibuprofen (ADVIL;MOTRIN) 800 MG tablet Take 1 tablet by mouth every 8 hours as needed for Pain  Jethro Lee MD       Social History     Tobacco Use    Smoking status: Current Every Day Smoker     Packs/day: 1.00     Years: 40.00     Pack years: 40.00     Types: Cigarettes    Smokeless tobacco: Never Used   Substance Use Topics    Alcohol use: Yes     Comment: occasional     Drug use: Yes     Types: Marijuana (Weed)        Allergies   Allergen Reactions    Adhesive Tape Rash   ,   Past Medical History:   Diagnosis Date    Arthritis    ,   Past Surgical History:   Procedure Laterality Date    ANTERIOR CRUCIATE LIGAMENT REPAIR Right     SPINAL FUSION     ,   Social History     Tobacco Use    Smoking status: Current Every Day Smoker     Packs/day: 1.00     Years: 40.00     Pack years: 40.00     Types: Cigarettes    Smokeless tobacco: Never Used   Substance Use Topics    Alcohol use: Yes     Comment: occasional     Drug use: Yes     Types: Marijuana (Weed)   , No family history on file.,   Immunization History   Administered Date(s) Administered    COVID-19, Pfizer Purple top, DILUTE for use, 12+ yrs, 30mcg/0.3mL dose 03/15/2021, 04/05/2021, 11/18/2021   ,   Health Maintenance   Topic Date Due    Pneumococcal 0-64 years Vaccine (1 - PCV) Never done    HIV screen  Never done    Hepatitis C screen  Never done    DTaP/Tdap/Td vaccine (1 - Tdap) Never done    Lipids  Never done    Shingles vaccine (1 of 2) Never done    Low dose CT lung screening  Never done    Flu vaccine (Season Ended) 10/07/2022 (Originally 9/1/2022)    Depression Screen  10/07/2022    Colorectal Cancer Screen  12/02/2024    COVID-19 Vaccine  Completed    Hepatitis A vaccine  Aged Out    Hepatitis B vaccine  Aged Out    Hib vaccine  Aged Out    Meningococcal (ACWY) vaccine  Aged Out       PHYSICAL EXAMINATION:  [ INSTRUCTIONS:  \"[x]\" Indicates a positive item  \"[]\" Indicates a negative item  -- DELETE ALL ITEMS NOT EXAMINED]  Constitutional: [x] Appears well-developed and well-nourished [x] No apparent distress        Mental status  [x] Alert and awake  [x] Oriented to person/place/time [x]Able to follow commands      Pulmonary/Chest: [x] Respiratory effort normal.  [x] No visualized signs of difficulty breathing or respiratory distress           Psychiatric:       [x] Normal Affect [x] No Hallucinations       Other pertinent observable physical exam findings-     ASSESSMENT/PLAN:  Diagnoses and all orders for this visit:  Anxiety  -     busPIRone (BUSPAR) 5 MG tablet; Take 1 tablet by mouth 2 times daily      Return in about 6 months (around 11/11/2022), or if symptoms worsen or fail to improve. Priscila Carranza, was evaluated through a synchronous (real-time) audio-video encounter.  The patient (or guardian if applicable) is aware that this is a billable service, which includes applicable co-pays. This Virtual Visit was conducted with patient's (and/or legal guardian's) consent. The visit was conducted pursuant to the emergency declaration under the [facility], [address] [place] [address] authority and the [employer] and [employer] General Act. Patient identification was verified, and a caregiver was present when appropriate. The patient was located at home in a state where the provider was licensed to provide care. Total time spent on this encounter: 21-30 mins    --Marry Sotelo MD on 5/11/2022 at 4:13 PM    An electronic signature was used to authenticate this note.

## 2022-05-27 ENCOUNTER — TELEPHONE (OUTPATIENT)
Dept: PAIN MANAGEMENT | Age: 57
End: 2022-05-27

## 2022-05-27 NOTE — TELEPHONE ENCOUNTER
Patient states he was to call and advise if his 2nd injection did not work and he states it worked for about 2 months and he's back to having pain again. His back pain has kept him home from work today and he is asking what the next step would be? Please contact him at earliest convenience to discuss (he is hoping to hear from the office today). Thank you.

## 2022-07-07 ENCOUNTER — HOSPITAL ENCOUNTER (OUTPATIENT)
Dept: PAIN MANAGEMENT | Age: 57
Discharge: HOME | End: 2022-07-07
Payer: COMMERCIAL

## 2022-07-07 VITALS
DIASTOLIC BLOOD PRESSURE: 77 MMHG | WEIGHT: 180 LBS | SYSTOLIC BLOOD PRESSURE: 133 MMHG | OXYGEN SATURATION: 96 % | HEIGHT: 72 IN | TEMPERATURE: 97.1 F | BODY MASS INDEX: 24.38 KG/M2 | HEART RATE: 86 BPM

## 2022-07-07 DIAGNOSIS — G89.29 CHRONIC BILATERAL LOW BACK PAIN WITH RIGHT-SIDED SCIATICA: ICD-10-CM

## 2022-07-07 DIAGNOSIS — M54.2 NECK PAIN: ICD-10-CM

## 2022-07-07 DIAGNOSIS — Z98.1 HISTORY OF LUMBAR FUSION: ICD-10-CM

## 2022-07-07 DIAGNOSIS — M54.41 CHRONIC BILATERAL LOW BACK PAIN WITH RIGHT-SIDED SCIATICA: ICD-10-CM

## 2022-07-07 DIAGNOSIS — M51.26 LUMBAR DISC HERNIATION: Primary | Chronic | ICD-10-CM

## 2022-07-07 PROCEDURE — 99213 OFFICE O/P EST LOW 20 MIN: CPT | Performed by: NURSE PRACTITIONER

## 2022-07-07 PROCEDURE — 99213 OFFICE O/P EST LOW 20 MIN: CPT

## 2022-07-07 ASSESSMENT — ENCOUNTER SYMPTOMS
BOWEL INCONTINENCE: 0
SHORTNESS OF BREATH: 0
COUGH: 0
BACK PAIN: 1
CONSTIPATION: 0

## 2022-07-07 ASSESSMENT — PAIN DESCRIPTION - PROGRESSION: CLINICAL_PROGRESSION: GRADUALLY WORSENING

## 2022-07-07 ASSESSMENT — PAIN DESCRIPTION - LOCATION: LOCATION: BACK;HIP;LEG

## 2022-07-07 ASSESSMENT — PAIN DESCRIPTION - PAIN TYPE: TYPE: CHRONIC PAIN

## 2022-07-07 ASSESSMENT — PAIN DESCRIPTION - DIRECTION: RADIATING_TOWARDS: DOWN THE RIGHT LEG TO THE KNEE

## 2022-07-07 ASSESSMENT — PAIN DESCRIPTION - ORIENTATION: ORIENTATION: RIGHT

## 2022-07-07 ASSESSMENT — PAIN DESCRIPTION - FREQUENCY: FREQUENCY: INTERMITTENT

## 2022-07-07 ASSESSMENT — PAIN SCALES - GENERAL: PAINLEVEL_OUTOF10: 7

## 2022-07-07 NOTE — PROGRESS NOTES
Chief Complaint   Patient presents with    Back Pain    Follow-up       [facility]     50-year-old man with history of chronic back pain  History of lumbar spine fusion surgery 10 years ago with pain returning approx 1 year ago with no known injury. Pain is significantly interfering with work activity level and quality of life  He tried rest home exercise and physical therapy with no significant improvement  MRI lumbar spine 10/21 that showed lumbar disc herniation at right-sided L5-S1 with displacement of the descending S1 nerve root  Pt did see neurosurgery at [facility] and was recommended for epidural injection. Has had right TFESI  L5/S1 in Feb and TF L5/S1 3/22 and reported significant relief. Feels pain has returned and would like to repeat injection    New c/o today of neck pain and intermittent Left ear to temple buzzing with no aggravating factors. Does alos have pain across neck with ROM, denies radiation down arms. Has not had area evaluated - will start with XR consider PT injections based on results      Back Pain  This is a chronic problem. The current episode started more than 1 year ago. The problem occurs intermittently. The problem has been gradually worsening since onset. The pain is present in the lumbar spine and gluteal. The quality of the pain is described as aching, burning, cramping, shooting and stabbing. The pain radiates to the right knee. The pain is at a severity of 7/10. The pain is moderate. The pain is worse during the day. The symptoms are aggravated by bending, sitting, standing, twisting, position and stress. Stiffness is present all day. Pertinent negatives include no bladder incontinence, bowel incontinence, chest pain or fever. He has tried heat, ice, walking, home exercises, bed rest, chiropractic manipulation, NSAIDs and muscle relaxant for the symptoms. The treatment provided significant relief. Neck Pain   This is a chronic problem.  The current episode started more than 1 month ago. The problem occurs constantly. The problem has been unchanged. The pain is associated with nothing. The pain is present in the left side and right side. The quality of the pain is described as aching. The pain is at a severity of 3/10. The symptoms are aggravated by position. The pain is worse during the day. Pertinent negatives include no chest pain or fever. He has tried heat, home exercises, ice, NSAIDs and acetaminophen for the symptoms. The treatment provided mild relief. Past Medical History:   Diagnosis Date    Arthritis        Past Surgical History:   Procedure Laterality Date    ANTERIOR CRUCIATE LIGAMENT REPAIR Right     SPINAL FUSION         Allergies   Allergen Reactions    Adhesive Tape Rash         Current Outpatient Medications:     amoxicillin (AMOXIL) 500 MG capsule, TAKE 1 CAPSULE BY MOUTH EVERY EIGHT HOURS UNTIL GONE, Disp: , Rfl:     HYDROcodone-acetaminophen (NORCO) 5-325 MG per tablet, TAKE 1 TABLET BY MOUTH EVERY 4 TO 6 HOURS AS NEEDED FOR PAIN, DO NOT EXCEED 8 TABLETS IN 24 HOURS, Disp: , Rfl:     ibuprofen (ADVIL;MOTRIN) 800 MG tablet, Take 1 tablet by mouth every 8 hours as needed for Pain, Disp: 90 tablet, Rfl: 0    History reviewed. No pertinent family history.     Social History     Socioeconomic History    Marital status:      Spouse name: Not on file    Number of children: Not on file    Years of education: Not on file    Highest education level: Not on file   Occupational History    Not on file   Tobacco Use    Smoking status: Current Every Day Smoker     Packs/day: 1.00     Years: 40.00     Pack years: 40.00     Types: Cigarettes    Smokeless tobacco: Never Used   Substance and Sexual Activity    Alcohol use: Yes     Comment: occasional     Drug use: Yes     Types: Marijuana Charmayne Stai)    Sexual activity: Not on file   Other Topics Concern    Not on file   Social History Narrative    Not on file     Social Determinants of Health Financial Resource Strain: Low Risk     Difficulty of Paying Living Expenses: Not hard at all   Food Insecurity: No Food Insecurity    Worried About Running Out of Food in the Last Year: Never true    Girish of Food in the Last Year: Never true   Transportation Needs:     Lack of Transportation (Medical): Not on file    Lack of Transportation (Non-Medical): Not on file   Physical Activity:     Days of Exercise per Week: Not on file    Minutes of Exercise per Session: Not on file   Stress:     Feeling of Stress : Not on file   Social Connections:     Frequency of Communication with Friends and Family: Not on file    Frequency of Social Gatherings with Friends and Family: Not on file    Attends Zoroastrian Services: Not on file    Active Member of [address] [place] [address] [employer] or Organizations: Not on file    Attends Club or Organization Meetings: Not on file    Marital Status: Not on file   Intimate Partner Violence:     Fear of Current or Ex-Partner: Not on file    Emotionally Abused: Not on file    Physically Abused: Not on file    Sexually Abused: Not on file   Housing Stability:     Unable to Pay for Housing in the Last Year: Not on file    Number of [place] in the Last Year: Not on file    Unstable Housing in the Last Year: Not on file       Review of Systems:  Review of Systems   Constitutional: Negative for chills and fever. Cardiovascular: Negative for chest pain. Respiratory: Negative for cough and shortness of breath. Musculoskeletal: Positive for back pain and neck pain. Gastrointestinal: Negative for bowel incontinence and constipation. Genitourinary: Negative for bladder incontinence. Physical Exam:  /77   Pulse 86   Temp 97.1 °F (36.2 °C) (Temporal)   Ht 6' (1.829 m)   Wt 180 lb (81.6 kg)   SpO2 96%   BMI 24.41 kg/m²     Physical Exam  Cardiovascular:      Rate and Rhythm: Normal rate.    Pulmonary:      Effort: Pulmonary effort is normal.   Musculoskeletal: Cervical back: Decreased range of motion. Lumbar back: Decreased range of motion. Skin:     General: Skin is warm and dry. Neurological:      Mental Status: He is alert and oriented to person, place, and time. Assessment:  Problem List Items Addressed This Visit     Lumbar disc herniation - Primary (Chronic)    Relevant Orders    Lumbar Epidural Steroid Injection/Caudal    History of lumbar fusion    Relevant Orders    Lumbar Epidural Steroid Injection/Caudal    Chronic bilateral low back pain with right-sided sciatica    Relevant Orders    Lumbar Epidural Steroid Injection/Caudal      Other Visit Diagnoses     Neck pain        Relevant Orders    XR CERVICAL SPINE FLEXION AND EXTENSION             Treatment Plan:    Patient relates significant relief from last intervention and would like to repeat d/t worsening pain  Bilat L5/S1 TFESI ordered   Cervical XR ordered    I have reviewed the chief complaint and history of present illness (including ROS and PFSH) and vital documentation by my staff and I agree with their documentation and have added where applicable.

## 2022-07-25 ENCOUNTER — HOSPITAL ENCOUNTER (OUTPATIENT)
Dept: PAIN MANAGEMENT | Age: 57
Discharge: HOME | End: 2022-07-25
Payer: COMMERCIAL

## 2022-07-25 ENCOUNTER — HOSPITAL ENCOUNTER (OUTPATIENT)
Dept: GENERAL RADIOLOGY | Age: 57
Discharge: HOME | End: 2022-07-27
Payer: COMMERCIAL

## 2022-07-25 VITALS
OXYGEN SATURATION: 98 % | DIASTOLIC BLOOD PRESSURE: 92 MMHG | TEMPERATURE: 97.7 F | WEIGHT: 164 LBS | BODY MASS INDEX: 22.21 KG/M2 | RESPIRATION RATE: 9 BRPM | HEIGHT: 72 IN | SYSTOLIC BLOOD PRESSURE: 130 MMHG | HEART RATE: 61 BPM

## 2022-07-25 DIAGNOSIS — R52 PAIN MANAGEMENT: ICD-10-CM

## 2022-07-25 DIAGNOSIS — Z98.1 HISTORY OF LUMBAR FUSION: ICD-10-CM

## 2022-07-25 DIAGNOSIS — M51.26 LUMBAR DISC HERNIATION: Primary | Chronic | ICD-10-CM

## 2022-07-25 DIAGNOSIS — M54.16 LUMBAR RADICULOPATHY: Chronic | ICD-10-CM

## 2022-07-25 PROCEDURE — 3209999900 FLUORO FOR SURGICAL PROCEDURES

## 2022-07-25 PROCEDURE — 64483 NJX AA&/STRD TFRM EPI L/S 1: CPT

## 2022-07-25 PROCEDURE — 64483 NJX AA&/STRD TFRM EPI L/S 1: CPT | Performed by: ANESTHESIOLOGY

## 2022-07-25 PROCEDURE — 6360000002 HC RX W HCPCS: Performed by: ANESTHESIOLOGY

## 2022-07-25 PROCEDURE — 99152 MOD SED SAME PHYS/QHP 5/>YRS: CPT | Performed by: ANESTHESIOLOGY

## 2022-07-25 PROCEDURE — 6360000004 HC RX CONTRAST MEDICATION: Performed by: ANESTHESIOLOGY

## 2022-07-25 PROCEDURE — 64484 NJX AA&/STRD TFRM EPI L/S EA: CPT | Performed by: ANESTHESIOLOGY

## 2022-07-25 PROCEDURE — 2580000003 HC RX 258: Performed by: ANESTHESIOLOGY

## 2022-07-25 PROCEDURE — 2500000003 HC RX 250 WO HCPCS: Performed by: ANESTHESIOLOGY

## 2022-07-25 RX ORDER — MIDAZOLAM HYDROCHLORIDE 1 MG/ML
INJECTION INTRAMUSCULAR; INTRAVENOUS
Status: COMPLETED | OUTPATIENT
Start: 2022-07-25 | End: 2022-07-25

## 2022-07-25 RX ORDER — DEXAMETHASONE SODIUM PHOSPHATE 10 MG/ML
INJECTION, SOLUTION INTRAMUSCULAR; INTRAVENOUS
Status: COMPLETED | OUTPATIENT
Start: 2022-07-25 | End: 2022-07-25

## 2022-07-25 RX ORDER — SODIUM CHLORIDE 0.9 % (FLUSH) 0.9 %
SYRINGE (ML) INJECTION
Status: COMPLETED | OUTPATIENT
Start: 2022-07-25 | End: 2022-07-25

## 2022-07-25 RX ORDER — LIDOCAINE HYDROCHLORIDE 10 MG/ML
INJECTION, SOLUTION EPIDURAL; INFILTRATION; INTRACAUDAL; PERINEURAL
Status: COMPLETED | OUTPATIENT
Start: 2022-07-25 | End: 2022-07-25

## 2022-07-25 RX ORDER — FENTANYL CITRATE 50 UG/ML
INJECTION, SOLUTION INTRAMUSCULAR; INTRAVENOUS
Status: COMPLETED | OUTPATIENT
Start: 2022-07-25 | End: 2022-07-25

## 2022-07-25 RX ADMIN — LIDOCAINE HYDROCHLORIDE 5 ML: 10 INJECTION, SOLUTION EPIDURAL; INFILTRATION; INTRACAUDAL; PERINEURAL at 08:08

## 2022-07-25 RX ADMIN — Medication 3 ML: at 08:07

## 2022-07-25 RX ADMIN — DEXAMETHASONE SODIUM PHOSPHATE 10 MG: 10 INJECTION, SOLUTION INTRAMUSCULAR; INTRAVENOUS at 08:10

## 2022-07-25 RX ADMIN — MIDAZOLAM 2 MG: 1 INJECTION INTRAMUSCULAR; INTRAVENOUS at 08:07

## 2022-07-25 RX ADMIN — IOHEXOL 3 ML: 180 INJECTION INTRAVENOUS at 08:10

## 2022-07-25 RX ADMIN — Medication 50 MCG: at 08:07

## 2022-07-25 ASSESSMENT — PAIN DESCRIPTION - DESCRIPTORS: DESCRIPTORS: ACHING;BURNING;SHARP;NUMBNESS

## 2022-07-25 ASSESSMENT — PAIN - FUNCTIONAL ASSESSMENT
PAIN_FUNCTIONAL_ASSESSMENT: ACTIVITIES ARE NOT PREVENTED
PAIN_FUNCTIONAL_ASSESSMENT: 0-10
PAIN_FUNCTIONAL_ASSESSMENT: 0-10

## 2022-07-25 ASSESSMENT — PAIN DESCRIPTION - PAIN TYPE: TYPE: SURGICAL PAIN

## 2022-07-25 ASSESSMENT — PAIN SCALES - GENERAL: PAINLEVEL_OUTOF10: 1

## 2022-07-25 NOTE — DISCHARGE INSTRUCTIONS
Discharge Instructions following Sedation or Anesthesia:  You have  received  a sedative/anesthetic therefore, you should not consume any alcoholic beverages for minimum of 12 hours. Do not drive or operate machinery for 24 hours. Do not sign legal documents for 24 hours. Dizziness, drowsiness, and unsteadiness may occur. Rest when need to. Increase diet as tolerated. Keep up on fluids if diet allows. General Instructions:  Do not take a tub bath for 72 hours after procedure (this includes hot tubs and swimming pools). You may shower, but avoid hot water to injection site. Avoid strenuous activity TODAY especially if you experience dizziness. Remove band-aid the next day. Wash off any residual iodine   Do not use heat, heating pad, or any other heating device over the injection site for 3 days after the procedure. If you experience pain after your procedure, you may continue with your current pain medication as prescribed. (DO NOT INCREASE YOUR PAIN MEDICATION WITHOUT TALKING TO DOCTOR)  Soreness and pain at injection site is common, may use ice to reduce soreness. What To Expect After A Steroid Injection  You should start to feel the effects of the steroid injection in about 48-72 hours  You may experience facial flushing, night sweats and irritability. Other common steroid related side effects are increased appetite, mood elevation, insomnia and fluid retention. These effects usually subside in a few days. Steroids used in epidural injections may cause muscle spasms for a few days. If you are diabetic, your blood sugar may be elevated after your procedure due to the steroids. You will need to monitor your blood sugar more closely while going through a series of injections (Check blood sugar at meals and bedtime for 5 days). You may require adjustment in your diabetic medications, contact your PCP office to discuss.     Call Arleen Bergman at [phone] if you experience:   Fever, chills or temperature over 100    Vomiting, Headache, persistent stiff neck, nausea, blurred vision   Difficulty in urinating or unable to urinate with 8 hours   Increase in weakness, numbness or loss of function   Increased redness, swelling or drainage at the injection site

## 2022-07-25 NOTE — OP NOTE
Patient Name: Dino Alcala   YOB: 1965  Room/Bed: Room/bed info not found  Medical Record Number: [id number]  Date: 7/25/2022       Preoperative Diagnosis:    1. Lumbar disc herniation    2. Lumbar radiculopathy    3. History of lumbar fusion          Postoperative diagnosis:   1. Lumbar disc herniation    2. Lumbar radiculopathy    3. History of lumbar fusion          Blood Loss: none    Procedure Performed:  Transforaminal lumbar epidural steroid injection at the levels of L5 AND S1 on the Right side under fluoroscopic guidance. Procedure: The Patient was seen in the preop area, chart was reviewed, informed consent was obtained. Patient was taken to procedure room and was placed in prone position. Vital signs were monitored through out the  Procedure. A time out was completed. The skin over the back was prepped and draped in sterile manner. The target point was marked in ipsilateral obliques just below the pedicle at the target levels. Skin and deep tissues were anesthetized with 1 % lidocaine. A 20-gauge, spinal needle was advanced  under fluoroscopy guidance in AP / Oblique and lateral views, such that the tip of the needle lies in the neuroforamina at about the 6 o'clock position under the pedicle of the target vertebra. This was confirmed with AP and lateral views of the fluoroscopy. Then after negative aspiration contrast dye Omnipaque-180 was injected with live fluoroscopy in AP views that showed  spread of the contrast in the epidural space  and no vascular runoff or intrathecal spread. Finally 3 ml of treatment solution containing 5 ml of  PF NS mixed with 1 ml of dexamethasone 10 mg / ml was injected. The needle was removed and a Band-Aid was placed over the needle  insertion site. The patient's vital signs remained stable and the patient tolerated the procedure well. The same procedure was then repeated at right at L 5level with same technique.  The remaining 3 ml of treatment solution was injected at that. The total dose of steroid injected today was dexamethasone 10 mg. Electronically signed by Shad Jiménez MD on 7/25/2022 at 8:38 AM    SEDATION NOTE:    ASA CLASSIFICATION  2  MP   CLASSIFICATION  2    Moderate intravenous conscious sedation was supervised by Dr. Georgina Magallanes  The patient was independently monitored by a Registered Nurse assigned to the Procedure Room  Monitoring included automated blood pressure, continuous EKG, Capnography and continuous pulse oximetry. The detailed Conscious Record is permanently stored in the [place] [id number].      The following is the conscious sedation record;  Start Time:  0801  End times:  0812  Duration:  11 MINUTES  MEDS GIVEN 2 MG VERSED AND 50 MCG FENTANYL

## 2022-07-25 NOTE — H&P
UPDATE:  Office visit pain clinic in [facility] with all required elements of H&P dated 07/07/2022  Patient seen preop, chart reviewed,   No changes in medical history and health assessment since last evaluation. PE:  AAO x 3, in NAD, VSS,. Resp: breathing on RA, no respiratory distress  Cardiac: rate normal    Risk / Benefits explained to patient, patient agree to proceed with plan.   ASA 3  MP 3

## 2022-07-26 ENCOUNTER — TELEPHONE (OUTPATIENT)
Dept: PAIN MANAGEMENT | Age: 57
End: 2022-07-26

## 2022-07-26 NOTE — TELEPHONE ENCOUNTER
Pt calls the office stating he had procedure 7/25 and is having extreme lower back cramping. 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  Will route message to Dr. Toma Mccoy

## 2022-07-27 ENCOUNTER — HOSPITAL ENCOUNTER (OUTPATIENT)
Dept: GENERAL RADIOLOGY | Age: 57
Discharge: HOME | End: 2022-07-29
Payer: COMMERCIAL

## 2022-07-27 ENCOUNTER — HOSPITAL ENCOUNTER (OUTPATIENT)
Age: 57
Discharge: HOME | End: 2022-07-29
Payer: COMMERCIAL

## 2022-07-27 DIAGNOSIS — M54.2 NECK PAIN: ICD-10-CM

## 2022-07-27 PROCEDURE — 72040 X-RAY EXAM NECK SPINE 2-3 VW: CPT

## 2022-08-08 ENCOUNTER — HOSPITAL ENCOUNTER (OUTPATIENT)
Dept: PAIN MANAGEMENT | Age: 57
Discharge: HOME | End: 2022-08-08
Payer: COMMERCIAL

## 2022-08-08 VITALS
HEART RATE: 66 BPM | OXYGEN SATURATION: 97 % | DIASTOLIC BLOOD PRESSURE: 83 MMHG | TEMPERATURE: 97.5 F | SYSTOLIC BLOOD PRESSURE: 129 MMHG

## 2022-08-08 DIAGNOSIS — M47.817 LUMBOSACRAL SPONDYLOSIS WITHOUT MYELOPATHY: ICD-10-CM

## 2022-08-08 DIAGNOSIS — M47.816 FACET HYPERTROPHY OF LUMBAR REGION: Chronic | ICD-10-CM

## 2022-08-08 DIAGNOSIS — M54.50 CHRONIC LOW BACK PAIN, UNSPECIFIED BACK PAIN LATERALITY, UNSPECIFIED WHETHER SCIATICA PRESENT: ICD-10-CM

## 2022-08-08 DIAGNOSIS — G89.29 CHRONIC LOW BACK PAIN, UNSPECIFIED BACK PAIN LATERALITY, UNSPECIFIED WHETHER SCIATICA PRESENT: ICD-10-CM

## 2022-08-08 DIAGNOSIS — M51.26 LUMBAR DISC HERNIATION: Chronic | ICD-10-CM

## 2022-08-08 DIAGNOSIS — M54.16 LUMBAR RADICULOPATHY: Primary | ICD-10-CM

## 2022-08-08 PROCEDURE — 99213 OFFICE O/P EST LOW 20 MIN: CPT | Performed by: NURSE PRACTITIONER

## 2022-08-08 PROCEDURE — 99213 OFFICE O/P EST LOW 20 MIN: CPT

## 2022-08-08 ASSESSMENT — ENCOUNTER SYMPTOMS
BACK PAIN: 1
BOWEL INCONTINENCE: 0

## 2022-08-08 NOTE — PROGRESS NOTES
Chief Complaint   Patient presents with    Follow Up After Procedure    Back Pain        PMH  69-year-old man with history of chronic back pain  History of lumbar spine fusion surgery 10 years ago with pain returning approx 1 year ago with no known injury. Pain is significantly interfering with work activity level and quality of life  He tried rest, home exercise and physical therapy with no significant improvement  MRI lumbar spine 10/21 that showed lumbar disc herniation at right-sided L5-S1 with displacement of the descending S1 nerve root  Pt did see neurosurgery at [facility] and was recommended for epidural injection. Has had right TFESI  L5/S1 in Feb and TF L5/S1 3/22 and reported significant relief. New c/o last visit of neck pain and intermittent left ear to temple buzzing with no aggravating factors. Does also have pain across neck with ROM, denies radiation down arms. XR cervical spine with multilevel degenerative change and  anterolisthesis at C4-5 on flexion which improves on extension. Pt is here for follow up after TF LESI L5 and S1. He reports no relief from this. He did have some relief from this injection earlier this year. He has tried PT and feels this aggravates his pain. Back Pain  This is a chronic problem. The current episode started more than 1 year ago. The problem occurs constantly. The pain is present in the lumbar spine. The pain radiates to the right thigh, right foot and right knee. The pain is at a severity of 7/10. The pain is moderate. The pain is Worse during the day. The symptoms are aggravated by bending, position, standing and sitting. Associated symptoms include numbness and tingling. Pertinent negatives include no bladder incontinence, bowel incontinence, chest pain, fever, headaches or weakness. He has tried NSAIDs, heat, ice and home exercises (TENS) for the symptoms. The treatment provided mild relief. Neck Pain   This is a chronic problem. The current episode started more than 1 year ago. The problem occurs intermittently. The problem has been gradually worsening. The pain is associated with nothing. The pain is present in the left side and midline. The quality of the pain is described as aching, burning, cramping, shooting and stabbing. The pain is at a severity of 4/10. The pain is moderate. Exacerbated by: movement. The pain is Same all the time. Stiffness is present All day. Associated symptoms include numbness and tingling. Pertinent negatives include no chest pain, fever, headaches or weakness. He has tried ice for the symptoms. The treatment provided mild relief. Patient denies any new neurological symptoms. No bowel or bladder incontinence, no weakness, and no falling. Past Medical History:   Diagnosis Date    Arthritis        Past Surgical History:   Procedure Laterality Date    ANTERIOR CRUCIATE LIGAMENT REPAIR Right     SPINAL FUSION         Allergies   Allergen Reactions    Adhesive Tape Rash         Current Outpatient Medications:     amoxicillin (AMOXIL) 500 MG capsule, TAKE 1 CAPSULE BY MOUTH EVERY EIGHT HOURS UNTIL GONE, Disp: , Rfl:     HYDROcodone-acetaminophen (NORCO) 5-325 MG per tablet, TAKE 1 TABLET BY MOUTH EVERY 4 TO 6 HOURS AS NEEDED FOR PAIN, DO NOT EXCEED 8 TABLETS IN 24 HOURS, Disp: , Rfl:     ibuprofen (ADVIL;MOTRIN) 800 MG tablet, Take 1 tablet by mouth every 8 hours as needed for Pain, Disp: 90 tablet, Rfl: 0    No family history on file.     Social History     Socioeconomic History    Marital status:      Spouse name: Not on file    Number of children: Not on file    Years of education: Not on file    Highest education level: Not on file   Occupational History    Not on file   Tobacco Use    Smoking status: Every Day     Packs/day: 1.00     Years: 40.00     Pack years: 40.00     Types: Cigarettes    Smokeless tobacco: Never   Substance and Sexual Activity    Alcohol use: Yes     Comment: occasional Drug use: Yes     Types: Marijuana Mariel Postin)    Sexual activity: Not on file   Other Topics Concern    Not on file   Social History Narrative    Not on file     Social Determinants of Health     Financial Resource Strain: Low Risk     Difficulty of Paying Living Expenses: Not hard at all   Food Insecurity: No Food Insecurity    Worried About Running Out of Food in the Last Year: Never true    Ran Out of Food in the Last Year: Never true   Transportation Needs: Not on file   Physical Activity: Not on file   Stress: Not on file   Social Connections: Not on file   Intimate Partner Violence: Not on file   Housing Stability: Not on file       Review of Systems:  Review of Systems   Constitutional: Negative for fever. Cardiovascular:  Negative for chest pain and palpitations. Musculoskeletal:  Positive for back pain and neck pain. Gastrointestinal:  Negative for bowel incontinence. Genitourinary:  Negative for bladder incontinence. Neurological:  Positive for numbness and tingling. Negative for disturbances in coordination, headaches, loss of balance and weakness. Physical Exam:  /83   Pulse 66   Temp 97.5 °F (36.4 °C) (Temporal)   SpO2 97%     Physical Exam  HENT:      Head: Normocephalic. Pulmonary:      Effort: Pulmonary effort is normal.   Musculoskeletal:         General: Normal range of motion. Cervical back: Normal range of motion. Tenderness present. Lumbar back: Tenderness present. Skin:     General: Skin is warm and dry. Neurological:      Mental Status: He is alert and oriented to person, place, and time.        Record/Diagnostics Review:        Assessment:  Problem List Items Addressed This Visit       Lumbar radiculopathy - Primary (Chronic)    Facet hypertrophy of lumbar region (Chronic)    Relevant Orders    FACET JOINT L/S    Lumbar disc herniation (Chronic)     Other Visit Diagnoses       Lumbosacral spondylosis without myelopathy        Relevant Orders    FACET JOINT L/S    Chronic low back pain, unspecified back pain laterality, unspecified whether sciatica present        Relevant Orders    FACET JOINT L/S               Treatment Plan:  No relief following TF LESI  Physical therapy makes his pain worse - he declines to participate in more sessions  Did discuss trying gabapentin and he is not interested in using medications for this pain due to concern it may interfere with his job  He does not want surgery - declines follow up with Ibeth Talbert  Has failed conservative options, significant axial low back pain with degenerative facet changes on MRI, good candidate for diagnostic lumbar facets, if this is beneficial would be a candidate for radiofrequency ablation. Pt would like to schedule MBB  Follow up after procedure. I have reviewed the chief complaint and history of present illness (including ROS and PFSH) and vital documentation by my staff and I agree with their documentation and have added where applicable.

## 2022-08-22 ENCOUNTER — HOSPITAL ENCOUNTER (OUTPATIENT)
Dept: GENERAL RADIOLOGY | Age: 57
Discharge: HOME | End: 2022-08-24
Payer: COMMERCIAL

## 2022-08-22 ENCOUNTER — HOSPITAL ENCOUNTER (OUTPATIENT)
Dept: PAIN MANAGEMENT | Age: 57
Discharge: HOME | End: 2022-08-22
Payer: COMMERCIAL

## 2022-08-22 VITALS
BODY MASS INDEX: 21.67 KG/M2 | OXYGEN SATURATION: 96 % | HEART RATE: 71 BPM | SYSTOLIC BLOOD PRESSURE: 124 MMHG | TEMPERATURE: 97.8 F | WEIGHT: 160 LBS | DIASTOLIC BLOOD PRESSURE: 91 MMHG | RESPIRATION RATE: 16 BRPM | HEIGHT: 72 IN

## 2022-08-22 DIAGNOSIS — M47.817 LUMBOSACRAL SPONDYLOSIS WITHOUT MYELOPATHY: Chronic | ICD-10-CM

## 2022-08-22 DIAGNOSIS — R52 PAIN MANAGEMENT: ICD-10-CM

## 2022-08-22 PROCEDURE — 6360000004 HC RX CONTRAST MEDICATION: Performed by: ANESTHESIOLOGY

## 2022-08-22 PROCEDURE — 6360000002 HC RX W HCPCS: Performed by: ANESTHESIOLOGY

## 2022-08-22 PROCEDURE — 64494 INJ PARAVERT F JNT L/S 2 LEV: CPT

## 2022-08-22 PROCEDURE — 64493 INJ PARAVERT F JNT L/S 1 LEV: CPT | Performed by: ANESTHESIOLOGY

## 2022-08-22 PROCEDURE — 64495 INJ PARAVERT F JNT L/S 3 LEV: CPT

## 2022-08-22 PROCEDURE — 64494 INJ PARAVERT F JNT L/S 2 LEV: CPT | Performed by: ANESTHESIOLOGY

## 2022-08-22 PROCEDURE — 3209999900 FLUORO FOR SURGICAL PROCEDURES

## 2022-08-22 PROCEDURE — 64495 INJ PARAVERT F JNT L/S 3 LEV: CPT | Performed by: ANESTHESIOLOGY

## 2022-08-22 PROCEDURE — 2500000003 HC RX 250 WO HCPCS: Performed by: ANESTHESIOLOGY

## 2022-08-22 PROCEDURE — 64493 INJ PARAVERT F JNT L/S 1 LEV: CPT

## 2022-08-22 PROCEDURE — 99152 MOD SED SAME PHYS/QHP 5/>YRS: CPT | Performed by: ANESTHESIOLOGY

## 2022-08-22 RX ORDER — BUPIVACAINE HYDROCHLORIDE 5 MG/ML
INJECTION, SOLUTION EPIDURAL; INTRACAUDAL
Status: COMPLETED | OUTPATIENT
Start: 2022-08-22 | End: 2022-08-22

## 2022-08-22 RX ORDER — MIDAZOLAM HYDROCHLORIDE 1 MG/ML
INJECTION INTRAMUSCULAR; INTRAVENOUS
Status: COMPLETED | OUTPATIENT
Start: 2022-08-22 | End: 2022-08-22

## 2022-08-22 RX ORDER — LIDOCAINE HYDROCHLORIDE 10 MG/ML
INJECTION, SOLUTION EPIDURAL; INFILTRATION; INTRACAUDAL; PERINEURAL
Status: COMPLETED | OUTPATIENT
Start: 2022-08-22 | End: 2022-08-22

## 2022-08-22 RX ORDER — FENTANYL CITRATE 50 UG/ML
INJECTION, SOLUTION INTRAMUSCULAR; INTRAVENOUS
Status: COMPLETED | OUTPATIENT
Start: 2022-08-22 | End: 2022-08-22

## 2022-08-22 RX ADMIN — LIDOCAINE HYDROCHLORIDE 5 ML: 10 INJECTION, SOLUTION EPIDURAL; INFILTRATION; INTRACAUDAL; PERINEURAL at 15:19

## 2022-08-22 RX ADMIN — BUPIVACAINE HYDROCHLORIDE 6 ML: 5 INJECTION, SOLUTION EPIDURAL; INTRACAUDAL; PERINEURAL at 15:21

## 2022-08-22 RX ADMIN — IOPAMIDOL 2 ML: 510 INJECTION, SOLUTION INTRAVASCULAR at 15:21

## 2022-08-22 RX ADMIN — Medication 50 MCG: at 15:19

## 2022-08-22 RX ADMIN — MIDAZOLAM 1 MG: 1 INJECTION INTRAMUSCULAR; INTRAVENOUS at 15:23

## 2022-08-22 RX ADMIN — MIDAZOLAM 1 MG: 1 INJECTION INTRAMUSCULAR; INTRAVENOUS at 15:18

## 2022-08-22 ASSESSMENT — PAIN - FUNCTIONAL ASSESSMENT
PAIN_FUNCTIONAL_ASSESSMENT: 0-10
PAIN_FUNCTIONAL_ASSESSMENT: 0-10

## 2022-08-22 NOTE — H&P
UPDATE:  Office visit pain clinic in [facility] with all required elements of H&P dated 08/08/2022  Patient seen preop, chart reviewed,   No changes in medical history and health assessment since last evaluation. PE:  AAO x 3, in NAD, VSS,. Resp: breathing on RA, no respiratory distress  Cardiac: rate normal    Risk / Benefits explained to patient, patient agree to proceed with plan.   ASA 3  MP 3

## 2022-08-22 NOTE — DISCHARGE INSTRUCTIONS
Discharge Instructions following Sedation or Anesthesia:  You have  received  a sedative/anesthetic therefore, you should not consume any alcoholic beverages for minimum of 12 hours. Do not drive or operate machinery for 24 hours. Do not sign legal documents for 24 hours. Dizziness, drowsiness, and unsteadiness may occur. Rest when need to. Increase diet as tolerated. Keep up on fluids if diet allows. General Instructions:  Do not take a tub bath for 72 hours after procedure (this includes hot tubs and swimming pools). You may shower, but avoid hot water to injection site. Avoid strenuous activity TODAY especially if you experience dizziness. Remove band-aid the next day. Wash off any residual iodine   Do not use heat, heating pad, or any other heating device over the injection site for 3 days after the procedure. If you experience pain after your procedure, you may continue with your current pain medication as prescribed. (DO NOT INCREASE YOUR PAIN MEDICATION WITHOUT TALKING TO DOCTOR)  Soreness and pain at injection site is common, may use ice to reduce soreness. Please complete pain diary as instructed.      Call Arleen Bergman at [phone] if you experience:   Fever, chills or temperature over 100    Vomiting, Headache, persistent stiff neck, nausea, blurred vision   Difficulty in urinating or unable to urinate with 8 hours   Increase in weakness, numbness or loss of function   Increased redness, swelling or drainage at the injection site

## 2022-08-22 NOTE — OP NOTE
Patient Name: Ramon Mcleod   YOB: 1965  Room/Bed: Room/bed info not found  Medical Record Number: [id number]  Date: 8/22/2022       Sedation/ Anesthesia Plan:   intravenous sedation   as needed. Medications Planned:   midazolam (Versed) / Fentanyl  Intravenously  as needed. Preoperative Diagnosis: Lumbar spondylosis w/o myelopathy or radiculopathy  Postoperative Diagnosis: Lumbar spondylosis w/o myelopathy or radiculopathy  Blood Loss: none    Procedure Performed:  Bilateral Lumbar Medial Branch nerve Blocks at the transverse processes of L3, L4, L5 and sacral  ala under fluoroscopy guidance    Procedure: The Patient was seen in the preop area, chart was reviewed, informed consent was obtained. Patient was taken to procedure room and was placed in prone position. Vital signs were monitored through out the  Procedure. A time out was completed. The skin over the back was prepped and draped in sterile manner. The target point was marked at the junction of Transverse process and superior articular process at the target levels. Skin and deep tissues were anesthetized with 1 % lidocaine. A 25-gauge needlele was advanced to the target spots under fluoroscopy guidance in AP / Lateral and Oblique views. Then after negative aspiration contrast dye was injected with live fluoroscopy in AP views that showed  spread of the contrast with no epidural space and no vascular runoff or intrathecal spread. Finally 0.5 ml of treatment solution 0.5 % BUPIVACAINE  was injected at each level. The needle was removed and a Band-Aid was placed over the needle  insertion site. The patient's vital signs remained stable and the patient tolerated the procedure well.       Electronically signed by Coco Thurston MD on 8/22/2022 at 3:29 PM    SEDATION NOTE:    ASA CLASSIFICATION  2  MP   CLASSIFICATION  2    Moderate intravenous conscious sedation was supervised by Dr. Eh Johnson  The patient was independently monitored by a Registered Nurse assigned to the Procedure Room  Monitoring included automated blood pressure, continuous EKG, Capnography and continuous pulse oximetry. The detailed Conscious Record is permanently stored in the [facility].      The following is the conscious sedation record;  Start Time:  1514  End times:  1530  Duration:  16 MINUTES  MEDS GIVEN 2 MG VERSED AND 50 MCG FENTANYL

## 2022-08-25 ENCOUNTER — TELEPHONE (OUTPATIENT)
Dept: PAIN MANAGEMENT | Age: 57
End: 2022-08-25

## 2022-08-25 NOTE — TELEPHONE ENCOUNTER
Pt spouse calls the office stating he has not received a follow up call from procedure 8/22.  Let her know I would get a message to  MA

## 2022-08-26 ENCOUNTER — TELEPHONE (OUTPATIENT)
Dept: PAIN MANAGEMENT | Age: 57
End: 2022-08-26

## 2022-08-26 DIAGNOSIS — M47.817 LUMBOSACRAL SPONDYLOSIS WITHOUT MYELOPATHY: Primary | ICD-10-CM

## 2022-08-26 NOTE — TELEPHONE ENCOUNTER
Called pt to go over MBB pain diary questions  Pain before MBB with activity - 8  Pain after MBB - 4  Patient states he went home and washed his motorcycle  Patient reports 100% pain relief    Please place order for second MBB.

## 2022-09-22 ENCOUNTER — HOSPITAL ENCOUNTER (OUTPATIENT)
Age: 57
Discharge: HOME | End: 2022-09-22
Payer: COMMERCIAL

## 2022-09-22 ENCOUNTER — OFFICE VISIT (OUTPATIENT)
Dept: PRIMARY CARE CLINIC | Age: 57
End: 2022-09-22
Payer: COMMERCIAL

## 2022-09-22 VITALS
OXYGEN SATURATION: 98 % | WEIGHT: 161 LBS | RESPIRATION RATE: 16 BRPM | HEART RATE: 71 BPM | BODY MASS INDEX: 21.84 KG/M2 | DIASTOLIC BLOOD PRESSURE: 78 MMHG | SYSTOLIC BLOOD PRESSURE: 118 MMHG

## 2022-09-22 DIAGNOSIS — R53.82 CHRONIC FATIGUE: ICD-10-CM

## 2022-09-22 DIAGNOSIS — R73.09 IMPAIRED GLUCOSE METABOLISM: ICD-10-CM

## 2022-09-22 DIAGNOSIS — F32.1 CURRENT MODERATE EPISODE OF MAJOR DEPRESSIVE DISORDER, UNSPECIFIED WHETHER RECURRENT (HCC): Primary | ICD-10-CM

## 2022-09-22 DIAGNOSIS — E78.2 MIXED HYPERLIPIDEMIA: ICD-10-CM

## 2022-09-22 DIAGNOSIS — M54.2 NECK PAIN: ICD-10-CM

## 2022-09-22 DIAGNOSIS — E55.9 VITAMIN D DEFICIENCY: ICD-10-CM

## 2022-09-22 LAB
ABSOLUTE EOS #: 0.1 K/UL (ref 0–0.4)
ABSOLUTE LYMPH #: 1.7 K/UL (ref 1–4.8)
ABSOLUTE MONO #: 0.4 K/UL (ref 0.1–1.3)
ALBUMIN SERPL-MCNC: 4.5 G/DL (ref 3.5–5.2)
ALP BLD-CCNC: 39 U/L (ref 40–129)
ALT SERPL-CCNC: 13 U/L (ref 5–41)
ANION GAP SERPL CALCULATED.3IONS-SCNC: 10 MMOL/L (ref 9–17)
AST SERPL-CCNC: 13 U/L
BASOPHILS # BLD: 1 % (ref 0–2)
BASOPHILS ABSOLUTE: 0 K/UL (ref 0–0.2)
BILIRUB SERPL-MCNC: 0.5 MG/DL (ref 0.3–1.2)
BUN BLDV-MCNC: 14 MG/DL (ref 6–20)
CALCIUM SERPL-MCNC: 9.2 MG/DL (ref 8.6–10.4)
CHLORIDE BLD-SCNC: 105 MMOL/L (ref 98–107)
CHOLESTEROL/HDL RATIO: 3.3
CHOLESTEROL: 178 MG/DL
CO2: 23 MMOL/L (ref 20–31)
CREAT SERPL-MCNC: 0.62 MG/DL (ref 0.7–1.2)
EOSINOPHILS RELATIVE PERCENT: 1 % (ref 0–4)
ESTIMATED AVERAGE GLUCOSE: 94 MG/DL
FOLATE: 9.7 NG/ML
GFR AFRICAN AMERICAN: >60 ML/MIN
GFR NON-AFRICAN AMERICAN: >60 ML/MIN
GFR SERPL CREATININE-BSD FRML MDRD: ABNORMAL ML/MIN/{1.73_M2}
GLUCOSE BLD-MCNC: 107 MG/DL (ref 70–99)
HBA1C MFR BLD: 4.9 % (ref 4–6)
HCT VFR BLD CALC: 43.7 % (ref 41–53)
HDLC SERPL-MCNC: 54 MG/DL
HEMOGLOBIN: 15.2 G/DL (ref 13.5–17.5)
LDL CHOLESTEROL: 107 MG/DL (ref 0–130)
LYMPHOCYTES # BLD: 28 % (ref 24–44)
MCH RBC QN AUTO: 34.1 PG (ref 26–34)
MCHC RBC AUTO-ENTMCNC: 34.9 G/DL (ref 31–37)
MCV RBC AUTO: 97.9 FL (ref 80–100)
MONOCYTES # BLD: 7 % (ref 1–7)
PDW BLD-RTO: 13.1 % (ref 11.5–14.9)
PLATELET # BLD: 291 K/UL (ref 150–450)
PMV BLD AUTO: 7.4 FL (ref 6–12)
POTASSIUM SERPL-SCNC: 3.9 MMOL/L (ref 3.7–5.3)
RBC # BLD: 4.46 M/UL (ref 4.5–5.9)
SEG NEUTROPHILS: 63 % (ref 36–66)
SEGMENTED NEUTROPHILS ABSOLUTE COUNT: 3.9 K/UL (ref 1.3–9.1)
SODIUM BLD-SCNC: 138 MMOL/L (ref 135–144)
TOTAL PROTEIN: 7 G/DL (ref 6.4–8.3)
TRIGL SERPL-MCNC: 85 MG/DL
TSH SERPL DL<=0.05 MIU/L-ACNC: 1.06 UIU/ML (ref 0.3–5)
VITAMIN B-12: 862 PG/ML (ref 232–1245)
VITAMIN D 25-HYDROXY: 28.5 NG/ML
WBC # BLD: 6 K/UL (ref 3.5–11)

## 2022-09-22 PROCEDURE — 82746 ASSAY OF FOLIC ACID SERUM: CPT

## 2022-09-22 PROCEDURE — 80061 LIPID PANEL: CPT

## 2022-09-22 PROCEDURE — 82306 VITAMIN D 25 HYDROXY: CPT

## 2022-09-22 PROCEDURE — 84443 ASSAY THYROID STIM HORMONE: CPT

## 2022-09-22 PROCEDURE — 80053 COMPREHEN METABOLIC PANEL: CPT

## 2022-09-22 PROCEDURE — 83036 HEMOGLOBIN GLYCOSYLATED A1C: CPT

## 2022-09-22 PROCEDURE — 36415 COLL VENOUS BLD VENIPUNCTURE: CPT

## 2022-09-22 PROCEDURE — 82607 VITAMIN B-12: CPT

## 2022-09-22 PROCEDURE — 99215 OFFICE O/P EST HI 40 MIN: CPT | Performed by: STUDENT IN AN ORGANIZED HEALTH CARE EDUCATION/TRAINING PROGRAM

## 2022-09-22 PROCEDURE — 85025 COMPLETE CBC W/AUTO DIFF WBC: CPT

## 2022-09-22 RX ORDER — TRAZODONE HYDROCHLORIDE 50 MG/1
50 TABLET ORAL NIGHTLY
Qty: 30 TABLET | Refills: 0 | Status: SHIPPED | OUTPATIENT
Start: 2022-09-22 | End: 2022-10-17 | Stop reason: SDUPTHER

## 2022-09-22 RX ORDER — GABAPENTIN 100 MG/1
100 CAPSULE ORAL NIGHTLY
Qty: 30 CAPSULE | Refills: 0 | Status: SHIPPED | OUTPATIENT
Start: 2022-09-22 | End: 2022-10-17 | Stop reason: SDUPTHER

## 2022-09-22 RX ORDER — FLUOXETINE HYDROCHLORIDE 20 MG/1
20 CAPSULE ORAL DAILY
Qty: 30 CAPSULE | Refills: 0 | Status: SHIPPED | OUTPATIENT
Start: 2022-09-22 | End: 2022-10-17 | Stop reason: SDUPTHER

## 2022-09-22 ASSESSMENT — PATIENT HEALTH QUESTIONNAIRE - PHQ9
SUM OF ALL RESPONSES TO PHQ QUESTIONS 1-9: 21
7. TROUBLE CONCENTRATING ON THINGS, SUCH AS READING THE NEWSPAPER OR WATCHING TELEVISION: 3
SUM OF ALL RESPONSES TO PHQ QUESTIONS 1-9: 18
6. FEELING BAD ABOUT YOURSELF - OR THAT YOU ARE A FAILURE OR HAVE LET YOURSELF OR YOUR FAMILY DOWN: 3
SUM OF ALL RESPONSES TO PHQ9 QUESTIONS 1 & 2: 3
3. TROUBLE FALLING OR STAYING ASLEEP: 3
1. LITTLE INTEREST OR PLEASURE IN DOING THINGS: 0
4. FEELING TIRED OR HAVING LITTLE ENERGY: 3
9. THOUGHTS THAT YOU WOULD BE BETTER OFF DEAD, OR OF HURTING YOURSELF: 3
10. IF YOU CHECKED OFF ANY PROBLEMS, HOW DIFFICULT HAVE THESE PROBLEMS MADE IT FOR YOU TO DO YOUR WORK, TAKE CARE OF THINGS AT HOME, OR GET ALONG WITH OTHER PEOPLE: 3
2. FEELING DOWN, DEPRESSED OR HOPELESS: 3
5. POOR APPETITE OR OVEREATING: 3
SUM OF ALL RESPONSES TO PHQ QUESTIONS 1-9: 21
8. MOVING OR SPEAKING SO SLOWLY THAT OTHER PEOPLE COULD HAVE NOTICED. OR THE OPPOSITE, BEING SO FIGETY OR RESTLESS THAT YOU HAVE BEEN MOVING AROUND A LOT MORE THAN USUAL: 0
SUM OF ALL RESPONSES TO PHQ QUESTIONS 1-9: 21

## 2022-09-22 ASSESSMENT — ENCOUNTER SYMPTOMS
EYE DISCHARGE: 0
RHINORRHEA: 0
ABDOMINAL DISTENTION: 0
EYE ITCHING: 0
COUGH: 0
BACK PAIN: 1
SHORTNESS OF BREATH: 0
WHEEZING: 0
ABDOMINAL PAIN: 0

## 2022-09-22 ASSESSMENT — COLUMBIA-SUICIDE SEVERITY RATING SCALE - C-SSRS
2. HAVE YOU ACTUALLY HAD ANY THOUGHTS OF KILLING YOURSELF?: NO
6. HAVE YOU EVER DONE ANYTHING, STARTED TO DO ANYTHING, OR PREPARED TO DO ANYTHING TO END YOUR LIFE?: NO
1. WITHIN THE PAST MONTH, HAVE YOU WISHED YOU WERE DEAD OR WISHED YOU COULD GO TO SLEEP AND NOT WAKE UP?: YES

## 2022-09-22 NOTE — RESULT ENCOUNTER NOTE
Labs show low vitamin D levels for which I recommend over-the-counter multivitamin with vitamin D in it.   All other labs normal.  Awaited HbA1c and B12 folate

## 2022-09-22 NOTE — LETTER
[facility]  [address]  Phone: [phone]  Fax: [phone]    Lori Ayala MD        September 22, 2022     Patient: Bobbi Parmar   YOB: 1965   Date of Visit: 9/22/2022       To Whom it May Concern:    Vy Gutierrez was seen in my clinic on 9/22/2022 for depression, neck pain and back pain. He may return to work on 9/23/2022. If you have any questions or concerns, please don't hesitate to call.     Sincerely,         Lori Ayala MD

## 2022-09-22 NOTE — PROGRESS NOTES
[facility] CARE  [facility] [address]  [address] [address]  Dept: [phone]  Dept Fax: [phone]    Bobbi Parmar is a 61 y.o. male who presents today for his medical conditions/complaints as noted below. Chief Complaint   Patient presents with    Depression     Hasn't been able to eat or sleep    Anxiety    Back Pain     X 1 yr, In the process of numbing nerves, last appt is next monday    Neck Pain     X2 months, causing headaches       HPI:     61 y. o. M presented to office for depression anxiety and chronic neck and back pain. He was accompanied by his wife. Patient had multiple concerns and questions. Patient mentioned that he has been struggling with depression and anxiety since a while now. His symptoms have gone so worse that he is not even eating and sleeping well. Did also have ideas of harming himself at one time but with no intent or plan. Currently denied any suicidal or homicidal ideations. His wife was also tearful and crying during whole conversation. Patient was initially seen as a virtual visit by me few months ago when he was having problems with a denture which was making his anxiety worse. Patient mentioned that dentures never fit him and he was choking while being on dentures so he stopped wearing them. He is not able to afford anymore interventions for his dental issues. Because of these things he tries to eat less and remains anxious. He was prescribed BuSpar in the past which she stopped taking on his own. He has struggled with anxiety and depression his whole life and had been tried on multiple medications in the past as well. Currently he is off any medications. He has also been struggling with lower back pain and neck pain. Follows up with pain management. He was really frustrated and angry as he had to go to multiple visits and he has been having financial problems as well.     Also requested his regular annual labs. Thank you  Vital signs stable. Advised lifestyle changes. Medications reviewed and refilled as appropriate, problem list updated. All concerns discussed in details and all questions answered to patient satisfaction. Hemoglobin A1C (%)   Date Value   10/08/2021 4.7             ( goal A1C is < 7)   No results found for: LABMICR  LDL Cholesterol (mg/dL)   Date Value   09/22/2022 107       (goal LDL is <100)   AST (U/L)   Date Value   09/22/2022 13     ALT (U/L)   Date Value   09/22/2022 13     BUN (mg/dL)   Date Value   09/22/2022 14     BP Readings from Last 3 Encounters:   09/22/22 118/78   08/22/22 (!) 124/91   08/08/22 129/83          (goal 120/80)    Past Medical History:   Diagnosis Date    Arthritis       Past Surgical History:   Procedure Laterality Date    ANTERIOR CRUCIATE LIGAMENT REPAIR Right     SPINAL FUSION         History reviewed. No pertinent family history. Social History     Tobacco Use    Smoking status: Every Day     Packs/day: 1.00     Years: 40.00     Pack years: 40.00     Types: Cigarettes    Smokeless tobacco: Never   Substance Use Topics    Alcohol use: Yes     Comment: occasional       Current Outpatient Medications   Medication Sig Dispense Refill    FLUoxetine (PROZAC) 20 MG capsule Take 1 capsule by mouth daily 30 capsule 0    traZODone (DESYREL) 50 MG tablet Take 1 tablet by mouth nightly 30 tablet 0    gabapentin (NEURONTIN) 100 MG capsule Take 1 capsule by mouth nightly for 30 days. 30 capsule 0     No current facility-administered medications for this visit.      Allergies   Allergen Reactions    Adhesive Tape Rash       Health Maintenance   Topic Date Due    Pneumococcal 0-64 years Vaccine (1 - PCV) Never done    DTaP/Tdap/Td vaccine (1 - Tdap) Never done    Shingles vaccine (1 of 2) Never done    Low dose CT lung screening  Never done    Flu vaccine (1) Never done    Hepatitis C screen  09/22/2023 (Originally 10/12/1983)    HIV screen Breath sounds: Normal breath sounds. No wheezing. Abdominal:      General: Bowel sounds are normal. There is no distension. Palpations: Abdomen is soft. Tenderness: There is no abdominal tenderness. There is no guarding or rebound. Musculoskeletal:         General: Normal range of motion. Cervical back: Normal range of motion and neck supple. Skin:     Findings: No erythema or rash. Neurological:      Mental Status: He is alert and oriented to person, place, and time. Cranial Nerves: No cranial nerve deficit. Psychiatric:      Comments: Frustrated and angry throughout the visit. Depressed     /78   Pulse 71   Resp 16   Wt 161 lb (73 kg)   SpO2 98%   BMI 21.84 kg/m²     Assessment:        Diagnoses and all orders for this visit:  Current moderate episode of major depressive disorder, unspecified whether recurrent ([facility])  -     FLUoxetine (PROZAC) 20 MG capsule; Take 1 capsule by mouth daily  -     traZODone (DESYREL) 50 MG tablet; Take 1 tablet by mouth nightly  -     Kathy Dunne MD, Psychiatry, [place]  Neck pain  -     gabapentin (NEURONTIN) 100 MG capsule; Take 1 capsule by mouth nightly for 30 days. Vitamin D deficiency  -     Vitamin D 25 Hydroxy; Future  Chronic fatigue  -     CBC with Auto Differential; Future  -     Comprehensive Metabolic Panel; Future  -     TSH with Reflex; Future  -     Vitamin B12 & Folate; Future  Impaired glucose metabolism  -     Hemoglobin A1C; Future  Mixed hyperlipidemia  -     Lipid Panel; Future          Additional time spent in answering multiple patient questions and concerns. Plan:      Return in about 6 months (around 3/22/2023).     Orders Placed This Encounter   Procedures    CBC with Auto Differential     Standing Status:   Future     Number of Occurrences:   1     Standing Expiration Date:   9/22/2023    Hemoglobin A1C     Standing Status:   Future     Number of Occurrences:   1     Standing Expiration Date: 3/22/2024    Lipid Panel     Standing Status:   Future     Number of Occurrences:   1     Standing Expiration Date:   9/22/2023     Order Specific Question:   Is Patient Fasting?/# of Hours     Answer:   8-10 hrs    Comprehensive Metabolic Panel     Standing Status:   Future     Number of Occurrences:   1     Standing Expiration Date:   3/22/2024    TSH with Reflex     Standing Status:   Future     Number of Occurrences:   1     Standing Expiration Date:   9/22/2023    Vitamin B12 & Folate     Standing Status:   Future     Number of Occurrences:   1     Standing Expiration Date:   9/22/2023    Vitamin D 25 Hydroxy     Standing Status:   Future     Number of Occurrences:   1     Standing Expiration Date:   3/22/2024    Nelly Aase, MD, Psychiatry, [place]     Referral Priority:   Routine     Referral Type:   Eval and Treat     Referral Reason:   Specialty Services Required     Referred to Provider:   Maisha Noyola MD     Requested Specialty:   Psychiatry     Number of Visits Requested:   1     Orders Placed This Encounter   Medications    FLUoxetine (PROZAC) 20 MG capsule     Sig: Take 1 capsule by mouth daily     Dispense:  30 capsule     Refill:  0    traZODone (DESYREL) 50 MG tablet     Sig: Take 1 tablet by mouth nightly     Dispense:  30 tablet     Refill:  0    gabapentin (NEURONTIN) 100 MG capsule     Sig: Take 1 capsule by mouth nightly for 30 days. Dispense:  30 capsule     Refill:  0         Patient given educational materials - see patient instructions. Discussed use, benefit, and side effects of prescribedmedications. All patient questions answered. Pt voiced understanding. Reviewed health maintenance. Instructed to continue current medications, diet and exercise. Patient agreed with treatment plan. Follow up as directed. I spent a total of 40 minutes face to face with this patient. Over 50% of that time was spent on counseling and care coordination.   Please see assessment and plan for details. Electronically signed by   Alejandra Day MD on 9/22/2022 at 1:42 PM  [facility]. Please note that this chart was generated using voice recognition Dragon dictation software. Although every effort was made to ensure the accuracy of this automatedtranscription, some errors in transcription may have occurred.

## 2022-09-23 ENCOUNTER — TELEPHONE (OUTPATIENT)
Dept: PAIN MANAGEMENT | Age: 57
End: 2022-09-23

## 2022-09-23 NOTE — TELEPHONE ENCOUNTER
Left VM regarding cancelling procedure for 9/26 due to insurance denying procedure no more out of network benefits available

## 2022-10-03 ENCOUNTER — HOSPITAL ENCOUNTER (OUTPATIENT)
Dept: PAIN MANAGEMENT | Age: 57
Discharge: HOME | End: 2022-10-03
Payer: COMMERCIAL

## 2022-10-03 ENCOUNTER — HOSPITAL ENCOUNTER (OUTPATIENT)
Dept: GENERAL RADIOLOGY | Age: 57
Discharge: HOME | End: 2022-10-05
Payer: COMMERCIAL

## 2022-10-03 ENCOUNTER — TELEPHONE (OUTPATIENT)
Dept: PAIN MANAGEMENT | Age: 57
End: 2022-10-03

## 2022-10-03 VITALS
SYSTOLIC BLOOD PRESSURE: 135 MMHG | HEART RATE: 83 BPM | WEIGHT: 151 LBS | OXYGEN SATURATION: 96 % | RESPIRATION RATE: 19 BRPM | TEMPERATURE: 97.1 F | HEIGHT: 72 IN | DIASTOLIC BLOOD PRESSURE: 85 MMHG | BODY MASS INDEX: 20.45 KG/M2

## 2022-10-03 DIAGNOSIS — M47.817 LUMBOSACRAL SPONDYLOSIS WITHOUT MYELOPATHY: Primary | Chronic | ICD-10-CM

## 2022-10-03 DIAGNOSIS — R52 PAIN MANAGEMENT: ICD-10-CM

## 2022-10-03 PROCEDURE — 64494 INJ PARAVERT F JNT L/S 2 LEV: CPT | Performed by: ANESTHESIOLOGY

## 2022-10-03 PROCEDURE — 6360000004 HC RX CONTRAST MEDICATION: Performed by: ANESTHESIOLOGY

## 2022-10-03 PROCEDURE — 3209999900 FLUORO FOR SURGICAL PROCEDURES

## 2022-10-03 PROCEDURE — 64493 INJ PARAVERT F JNT L/S 1 LEV: CPT

## 2022-10-03 PROCEDURE — 64493 INJ PARAVERT F JNT L/S 1 LEV: CPT | Performed by: ANESTHESIOLOGY

## 2022-10-03 PROCEDURE — 2500000003 HC RX 250 WO HCPCS: Performed by: ANESTHESIOLOGY

## 2022-10-03 PROCEDURE — 64494 INJ PARAVERT F JNT L/S 2 LEV: CPT

## 2022-10-03 PROCEDURE — 64495 INJ PARAVERT F JNT L/S 3 LEV: CPT | Performed by: ANESTHESIOLOGY

## 2022-10-03 PROCEDURE — 6360000002 HC RX W HCPCS: Performed by: ANESTHESIOLOGY

## 2022-10-03 PROCEDURE — 64495 INJ PARAVERT F JNT L/S 3 LEV: CPT

## 2022-10-03 PROCEDURE — 99152 MOD SED SAME PHYS/QHP 5/>YRS: CPT | Performed by: ANESTHESIOLOGY

## 2022-10-03 RX ORDER — MIDAZOLAM HYDROCHLORIDE 1 MG/ML
INJECTION INTRAMUSCULAR; INTRAVENOUS
Status: COMPLETED | OUTPATIENT
Start: 2022-10-03 | End: 2022-10-03

## 2022-10-03 RX ORDER — LIDOCAINE HYDROCHLORIDE 10 MG/ML
INJECTION, SOLUTION EPIDURAL; INFILTRATION; INTRACAUDAL; PERINEURAL
Status: COMPLETED | OUTPATIENT
Start: 2022-10-03 | End: 2022-10-03

## 2022-10-03 RX ORDER — FENTANYL CITRATE 50 UG/ML
INJECTION, SOLUTION INTRAMUSCULAR; INTRAVENOUS
Status: COMPLETED | OUTPATIENT
Start: 2022-10-03 | End: 2022-10-03

## 2022-10-03 RX ORDER — BUPIVACAINE HYDROCHLORIDE 5 MG/ML
INJECTION, SOLUTION EPIDURAL; INTRACAUDAL
Status: COMPLETED | OUTPATIENT
Start: 2022-10-03 | End: 2022-10-03

## 2022-10-03 RX ADMIN — IOPAMIDOL 5 ML: 408 INJECTION, SOLUTION INTRATHECAL at 15:17

## 2022-10-03 RX ADMIN — FENTANYL CITRATE 50 MCG: 50 INJECTION, SOLUTION INTRAMUSCULAR; INTRAVENOUS at 15:13

## 2022-10-03 RX ADMIN — BUPIVACAINE HYDROCHLORIDE 6 ML: 5 INJECTION, SOLUTION EPIDURAL; INTRACAUDAL; PERINEURAL at 15:18

## 2022-10-03 RX ADMIN — LIDOCAINE HYDROCHLORIDE 5 ML: 10 INJECTION, SOLUTION EPIDURAL; INFILTRATION; INTRACAUDAL; PERINEURAL at 15:14

## 2022-10-03 RX ADMIN — MIDAZOLAM 2 MG: 1 INJECTION INTRAMUSCULAR; INTRAVENOUS at 15:12

## 2022-10-03 ASSESSMENT — PAIN - FUNCTIONAL ASSESSMENT: PAIN_FUNCTIONAL_ASSESSMENT: 0-10

## 2022-10-03 ASSESSMENT — PAIN DESCRIPTION - DESCRIPTORS: DESCRIPTORS: ACHING;BURNING;THROBBING

## 2022-10-03 NOTE — DISCHARGE INSTRUCTIONS
Discharge Instructions following Sedation or Anesthesia:  You have  received  a sedative/anesthetic therefore, you should not consume any alcoholic beverages for minimum of 12 hours. Do not drive or operate machinery for 24 hours. Do not sign legal documents for 24 hours. Dizziness, drowsiness, and unsteadiness may occur. Rest when need to. Increase diet as tolerated. Keep up on fluids if diet allows. General Instructions:  Do not take a tub bath for 72 hours after procedure (this includes hot tubs and swimming pools). You may shower, but avoid hot water to injection site. Avoid strenuous activity TODAY especially if you experience dizziness. Remove band-aid the next day. Wash off any residual iodine   Do not use heat, heating pad, or any other heating device over the injection site for 3 days after the procedure. If you experience pain after your procedure, you may continue with your current pain medication as prescribed. (DO NOT INCREASE YOUR PAIN MEDICATION WITHOUT TALKING TO DOCTOR)  Soreness and pain at injection site is common, may use ice to reduce soreness. Please complete pain diary as instructed.      Call Arleen Bergman at [phone] if you experience:   Fever, chills or temperature over 100    Vomiting, Headache, persistent stiff neck, nausea, blurred vision   Difficulty in urinating or unable to urinate with 8 hours   Increase in weakness, numbness or loss of function   Increased redness, swelling or drainage at the injection site

## 2022-10-03 NOTE — TELEPHONE ENCOUNTER
Called pt in regards to procedure being denied by [employer], pt was very rude and upset saying everything should have been taken care of and hung up saying he would be in for his procedure still today

## 2022-10-03 NOTE — OP NOTE
Patient Name: Nahid Poole   YOB: 1965  Room/Bed: Room/bed info not found  Medical Record Number: [id number]  Date: 10/3/2022       Sedation/ Anesthesia Plan:   intravenous sedation   as needed. Medications Planned:   midazolam (Versed) / Fentanyl  Intravenously  as needed. Preoperative Diagnosis: Lumbar spondylosis w/o myelopathy or radiculopathy  Postoperative Diagnosis: Lumbar spondylosis w/o myelopathy or radiculopathy  Blood Loss: none    Procedure Performed:  Bilateral Lumbar Medial Branch nerve Blocks at the transverse processes of L3, L4, L5 and sacral  ala under fluoroscopy guidance    Procedure: The Patient was seen in the preop area, chart was reviewed, informed consent was obtained. Patient was taken to procedure room and was placed in prone position. Vital signs were monitored through out the  Procedure. A time out was completed. The skin over the back was prepped and draped in sterile manner. The target point was marked at the junction of Transverse process and superior articular process at the target levels. Skin and deep tissues were anesthetized with 1 % lidocaine. A 25-gauge needlele was advanced to the target spots under fluoroscopy guidance in AP / Lateral and Oblique views. Then after negative aspiration contrast dye was injected with live fluoroscopy in AP views that showed  spread of the contrast with no epidural space and no vascular runoff or intrathecal spread. Finally 0.5 ml of treatment solution 0.5% BUPIVACAINE  was injected at each level. The needle was removed and a Band-Aid was placed over the needle  insertion site. The patient's vital signs remained stable and the patient tolerated the procedure well.       Electronically signed by Apolonia Frost MD on 10/3/2022 at 3:29 PM    SEDATION NOTE:    ASA CLASSIFICATION  2  MP   CLASSIFICATION  2    Moderate intravenous conscious sedation was supervised by Dr. Kalpesh Kulkarni  The patient was independently monitored by a Registered Nurse assigned to the Procedure Room  Monitoring included automated blood pressure, continuous EKG, Capnography and continuous pulse oximetry. The detailed Conscious Record is permanently stored in the [employer].      The following is the conscious sedation record;  Start Time:  1509  End times:  1526  Duration:  17 MINUTES  MEDS GIVEN 2 MG VERSED AND 50 MCG FENTANYL

## 2022-10-03 NOTE — H&P
Pain Pre-Op H&P Note    Jerry Morales MD    HPI: David Dougherty  presents with   71-year-old man with history of chronic back pain  History of lumbar spine fusion surgery 10 years ago with pain returning approx 1 year ago with no known injury. Pain is significantly interfering with work activity level and quality of life  He tried rest, home exercise and physical therapy with no significant improvement    Past Medical History:   Diagnosis Date    Arthritis        Past Surgical History:   Procedure Laterality Date    ANTERIOR CRUCIATE LIGAMENT REPAIR Right     SPINAL FUSION         History reviewed. No pertinent family history. Allergies   Allergen Reactions    Adhesive Tape Rash         Current Outpatient Medications:     Multiple Vitamins-Minerals (CENTRUM SILVER 50+MEN) TABS, Take 1 tablet by mouth daily, Disp: , Rfl:     FLUoxetine (PROZAC) 20 MG capsule, Take 1 capsule by mouth daily, Disp: 30 capsule, Rfl: 0    traZODone (DESYREL) 50 MG tablet, Take 1 tablet by mouth nightly, Disp: 30 tablet, Rfl: 0    gabapentin (NEURONTIN) 100 MG capsule, Take 1 capsule by mouth nightly for 30 days. , Disp: 30 capsule, Rfl: 0    Social History     Tobacco Use    Smoking status: Every Day     Packs/day: 1.00     Years: 40.00     Pack years: 40.00     Types: Cigarettes    Smokeless tobacco: Never   Substance Use Topics    Alcohol use: Yes     Comment: occasional        Review of Systems:   Focused review of systems was performed, and negative as pertinent to diagnosis, except as stated in HPI. Physical Exam  Constitutional:       Appearance: Normal appearance. Pulmonary:      Effort: Pulmonary effort is normal.   Neurological:      Mental Status: alert. Psychiatric:         Attention and Perception: Attention and perception normal.         Mood and Affect: Mood and affect normal.   Cardiovascular:      Rate: Normal rate.          ASA: 2          Mallampati: 2       Patient's current physical status, medications, medical history, and HPI have been reviewed and updated as appropriate on this date: 10/03/22    Risk/Benefit(s): The risks, benefits, alternatives, and potential complications have been discussed with the patient/family and informed consent has been obtained for the procedure/sedation. Diagnosis:   1.  Lumbosacral spondylosis without myelopathy            Plan:   BILATERAL LUMBAR DIAGNOSTIC Anastasia Jean MD

## 2022-10-03 NOTE — DISCHARGE INSTRUCTIONS
Discharge Instructions following Sedation or Anesthesia:  You have  received  a sedative/anesthetic therefore, you should not consume any alcoholic beverages for minimum of 12 hours. Do not drive or operate machinery for 24 hours. Do not sign legal documents for 24 hours. Dizziness, drowsiness, and unsteadiness may occur. Rest when need to. Increase diet as tolerated. Keep up on fluids if diet allows. General Instructions:  Do not take a tub bath for 72 hours after procedure (this includes hot tubs and swimming pools). You may shower, but avoid hot water to injection site. Avoid strenuous activity TODAY especially if you experience dizziness. Remove band-aid the next day. Wash off any residual iodine   Do not use heat, heating pad, or any other heating device over the injection site for 3 days after the procedure. If you experience pain after your procedure, you may continue with your current pain medication as prescribed. (DO NOT INCREASE YOUR PAIN MEDICATION WITHOUT TALKING TO DOCTOR)  Soreness and pain at injection site is common, may use ice to reduce soreness. Please complete pain diary as instructed.      Call Arleen Bergman at [phone] if you experience:   Fever, chills or temperature over 100    Vomiting, Headache, persistent stiff neck, nausea, blurred vision   Difficulty in urinating or unable to urinate with 8 hours   Increase in weakness, numbness or loss of function   Increased redness, swelling or drainage at the injection site

## 2022-10-07 ENCOUNTER — TELEPHONE (OUTPATIENT)
Dept: PAIN MANAGEMENT | Age: 57
End: 2022-10-07

## 2022-10-07 NOTE — TELEPHONE ENCOUNTER
Yazan alejandra called pt do to his denial for procedure from [place] and advised he will have to go to [address] for his procedure and pain management he told me he is sick of us calling him he has front path insurance and it was given to Olivia to put in his chart and for us to figure it out he has been dealing with this for weeks and the card was given to Olivia that [place] is 2nd to [employer] path

## 2022-10-17 DIAGNOSIS — F32.1 CURRENT MODERATE EPISODE OF MAJOR DEPRESSIVE DISORDER, UNSPECIFIED WHETHER RECURRENT (HCC): ICD-10-CM

## 2022-10-17 DIAGNOSIS — M54.2 NECK PAIN: ICD-10-CM

## 2022-10-17 RX ORDER — FLUOXETINE HYDROCHLORIDE 20 MG/1
20 CAPSULE ORAL DAILY
Qty: 30 CAPSULE | Refills: 3 | Status: SHIPPED | OUTPATIENT
Start: 2022-10-17

## 2022-10-17 RX ORDER — TRAZODONE HYDROCHLORIDE 50 MG/1
50 TABLET ORAL NIGHTLY
Qty: 30 TABLET | Refills: 3 | Status: SHIPPED | OUTPATIENT
Start: 2022-10-17

## 2022-10-17 RX ORDER — GABAPENTIN 100 MG/1
100 CAPSULE ORAL NIGHTLY
Qty: 60 CAPSULE | Refills: 0 | Status: SHIPPED | OUTPATIENT
Start: 2022-10-17 | End: 2022-12-16

## 2022-10-21 ENCOUNTER — TELEPHONE (OUTPATIENT)
Dept: PAIN MANAGEMENT | Age: 57
End: 2022-10-21

## 2022-10-21 DIAGNOSIS — M47.817 LUMBOSACRAL SPONDYLOSIS WITHOUT MYELOPATHY: Primary | ICD-10-CM

## 2022-10-21 NOTE — TELEPHONE ENCOUNTER
Patient returned call for MBB pain dairy questions  Pain before MBB with activity - 8  Pain after MBB - 0  Patient stated that he did some house work and gardening  Patient reports 100% pain relief    Please place order for RFA

## 2022-11-28 ENCOUNTER — HOSPITAL ENCOUNTER (OUTPATIENT)
Dept: PAIN MANAGEMENT | Age: 57
Discharge: HOME | End: 2022-11-28
Payer: COMMERCIAL

## 2022-11-28 ENCOUNTER — HOSPITAL ENCOUNTER (OUTPATIENT)
Dept: GENERAL RADIOLOGY | Age: 57
Discharge: HOME | End: 2022-11-30
Payer: COMMERCIAL

## 2022-11-28 VITALS
DIASTOLIC BLOOD PRESSURE: 99 MMHG | TEMPERATURE: 97.8 F | HEART RATE: 74 BPM | RESPIRATION RATE: 21 BRPM | SYSTOLIC BLOOD PRESSURE: 140 MMHG | OXYGEN SATURATION: 95 %

## 2022-11-28 DIAGNOSIS — M47.817 LUMBOSACRAL SPONDYLOSIS WITHOUT MYELOPATHY: Primary | Chronic | ICD-10-CM

## 2022-11-28 DIAGNOSIS — R52 PAIN MANAGEMENT: ICD-10-CM

## 2022-11-28 PROCEDURE — 6360000002 HC RX W HCPCS: Performed by: ANESTHESIOLOGY

## 2022-11-28 PROCEDURE — 99152 MOD SED SAME PHYS/QHP 5/>YRS: CPT | Performed by: ANESTHESIOLOGY

## 2022-11-28 PROCEDURE — 3209999900 FLUORO FOR SURGICAL PROCEDURES

## 2022-11-28 PROCEDURE — 2500000003 HC RX 250 WO HCPCS: Performed by: ANESTHESIOLOGY

## 2022-11-28 PROCEDURE — 64636 DESTROY L/S FACET JNT ADDL: CPT | Performed by: ANESTHESIOLOGY

## 2022-11-28 PROCEDURE — 64635 DESTROY LUMB/SAC FACET JNT: CPT | Performed by: ANESTHESIOLOGY

## 2022-11-28 PROCEDURE — 64635 DESTROY LUMB/SAC FACET JNT: CPT

## 2022-11-28 PROCEDURE — 64636 DESTROY L/S FACET JNT ADDL: CPT

## 2022-11-28 RX ORDER — LIDOCAINE HYDROCHLORIDE 10 MG/ML
INJECTION, SOLUTION EPIDURAL; INFILTRATION; INTRACAUDAL; PERINEURAL
Status: COMPLETED | OUTPATIENT
Start: 2022-11-28 | End: 2022-11-28

## 2022-11-28 RX ORDER — BUPROPION HYDROCHLORIDE 300 MG/1
300 TABLET ORAL EVERY MORNING
COMMUNITY

## 2022-11-28 RX ORDER — LIDOCAINE HYDROCHLORIDE 40 MG/ML
INJECTION, SOLUTION RETROBULBAR; TOPICAL
Status: COMPLETED | OUTPATIENT
Start: 2022-11-28 | End: 2022-11-28

## 2022-11-28 RX ORDER — FENTANYL CITRATE 0.05 MG/ML
INJECTION, SOLUTION INTRAMUSCULAR; INTRAVENOUS
Status: COMPLETED | OUTPATIENT
Start: 2022-11-28 | End: 2022-11-28

## 2022-11-28 RX ORDER — MIDAZOLAM HYDROCHLORIDE 1 MG/ML
INJECTION INTRAMUSCULAR; INTRAVENOUS
Status: COMPLETED | OUTPATIENT
Start: 2022-11-28 | End: 2022-11-28

## 2022-11-28 RX ADMIN — FENTANYL CITRATE 50 MCG: 0.05 INJECTION, SOLUTION INTRAMUSCULAR; INTRAVENOUS at 10:42

## 2022-11-28 RX ADMIN — LIDOCAINE HYDROCHLORIDE 2 ML: 40 INJECTION, SOLUTION RETROBULBAR; TOPICAL at 10:57

## 2022-11-28 RX ADMIN — LIDOCAINE HYDROCHLORIDE 3 ML: 40 INJECTION, SOLUTION RETROBULBAR; TOPICAL at 10:49

## 2022-11-28 RX ADMIN — MIDAZOLAM 2 MG: 1 INJECTION INTRAMUSCULAR; INTRAVENOUS at 10:42

## 2022-11-28 RX ADMIN — LIDOCAINE HYDROCHLORIDE 5 ML: 10 INJECTION, SOLUTION EPIDURAL; INFILTRATION; INTRACAUDAL; PERINEURAL at 10:50

## 2022-11-28 RX ADMIN — FENTANYL CITRATE 50 MCG: 0.05 INJECTION, SOLUTION INTRAMUSCULAR; INTRAVENOUS at 10:51

## 2022-11-28 ASSESSMENT — PAIN - FUNCTIONAL ASSESSMENT
PAIN_FUNCTIONAL_ASSESSMENT: 0-10
PAIN_FUNCTIONAL_ASSESSMENT: PREVENTS OR INTERFERES SOME ACTIVE ACTIVITIES AND ADLS

## 2022-11-28 ASSESSMENT — PAIN SCALES - GENERAL: PAINLEVEL_OUTOF10: 2

## 2022-11-28 ASSESSMENT — PAIN DESCRIPTION - DESCRIPTORS: DESCRIPTORS: SHARP;THROBBING;ACHING

## 2022-11-28 NOTE — OP NOTE
Preoperative Diagnosis: Lumbar spondylosis w/o myelopathy, chronic low back pain  Postoperative Diagnosis: Lumbar spondylosis w/o myelopathy, chronic low back pain  SEDATION: SEE SEDATION NOTE  BLOOD LOSS: NONE    Procedure Performed:  :Radiofrequency ablation of median branches at the Transverse processes of L3, L4, L5 AND SACRAL ALA for L3/4, L4/5 AND L5/S1 facet joints on Bilateral under fluoroscopic guidance with IV sedation. t    Procedure:    After starting an IV, the patient was taken to procedure room. The patient was placed in prone position and skin over the back was prepped and draped in sterile manner. Standard monitors were connected and vitals were monitored during the case and they remained stable during the procedure. A meaningful communication was kept up with the patient throughout the procedure. Then using fluoroscopy the junction of the transverse process of the target vertebra with the superior process of the facet joint was observed and the view was optimized. The skin and deep tissues were infiltrated with 2 ml of  1 % lidocaine. The RF canula with the 10 mm active tip was introduced through the skin wheal under fluoroscopy guidance such that the tip of the needle lies in the groove of the transverse process with the superior processes of the facet joint. Then a lateral and AP view of the lumbar spine was obtained to make sure the tip of needle is not in the neural foramen. Then electric impedence was checked to make sure it is acceptable. Then a sensory stimulus was applied at 50 Hz up to 0.5 volt and concordant pain symptoms were reproduced. Then a motor stimulus was applied at 2 Hz up to 2 volts or 3 x times the sensory stimulus and no motor stimulation was seen in lower extremities. Some multifidus stimulus was seen. Then after negative aspiration 1 ml of 4% lidocaine was injected through the needle at each level. The radiofrequency lesion was done at 85 degrees centigrade for 110 seconds. Patient's vital signs and neurological status remained stable throughout the procedure and post procedural period. The patient tolerated the procedure well and was discharged home in stable condition. SEDATION NOTE:    ASA CLASSIFICATION  3  MP   CLASSIFICATION  3    Moderate intravenous conscious sedation was supervised by Dr. Wandy Mosley  The patient was independently monitored by a Registered Nurse assigned to the Procedure Room  Monitoring included automated blood pressure, continuous EKG, Capnography and continuous pulse oximetry. The detailed Conscious Record is permanently stored in the [employer] .      The following is the conscious sedation record;  Start Time:  1039  End times:  1101  Duration:  22 MINUTES  MEDS GIVEN 2 MG VERSED  MCG FENTANYL

## 2022-11-28 NOTE — DISCHARGE INSTRUCTIONS
Discharge Instructions following Sedation or Anesthesia:  You have  received  a sedative/anesthetic therefore, you should not consume any alcoholic beverages for minimum of 12 hours. Do not drive or operate machinery for 24 hours. Do not sign legal documents for 24 hours. Dizziness, drowsiness, and unsteadiness may occur. Rest when need to. Increase diet as tolerated. Keep up on fluids if diet allows. General Instructions:  Do not take a tub bath for 72 hours after procedure (this includes hot tubs and swimming pools). You may shower, but avoid hot water to injection site. Avoid strenuous activity TODAY especially if you experience dizziness. Remove band-aid the next day. Wash off any residual iodine   Do not use heat, heating pad, or any other heating device over the injection site for 3 days after the procedure. If you experience pain after your procedure, you may continue with your current pain medication as prescribed. (DO NOT INCREASE YOUR PAIN MEDICATION WITHOUT TALKING TO DOCTOR)  Soreness and pain at injection site is common, may use ice to reduce soreness.     Call Arleen Bergman at [phone] if you experience:   Fever, chills or temperature over 100    Vomiting, Headache, persistent stiff neck, nausea, blurred vision   Difficulty in urinating or unable to urinate with 8 hours   Increase in weakness, numbness or loss of function   Increased redness, swelling or drainage at the injection site

## 2022-11-28 NOTE — H&P
Pain Pre-Op H&P Note    Darnell Wright MD    HPI: Kassandra Garrett  presents with     54-year-old man with history of chronic back pain  History of lumbar spine fusion surgery 10 years ago with pain returning approx 1 year ago with no known injury. Pain is significantly interfering with work activity level and quality of life  He tried rest, home exercise and physical therapy with no significant improvement  Past Medical History:   Diagnosis Date    Arthritis        Past Surgical History:   Procedure Laterality Date    ANTERIOR CRUCIATE LIGAMENT REPAIR Right     PAIN MANAGEMENT PROCEDURE  10/2022    test block series    SPINAL FUSION         History reviewed. No pertinent family history. Allergies   Allergen Reactions    Adhesive Tape Rash         Current Outpatient Medications:     buPROPion (WELLBUTRIN XL) 300 MG extended release tablet, Take 300 mg by mouth every morning, Disp: , Rfl:     traZODone (DESYREL) 50 MG tablet, Take 1 tablet by mouth nightly, Disp: 30 tablet, Rfl: 3    gabapentin (NEURONTIN) 100 MG capsule, Take 1 capsule by mouth nightly for 60 days. , Disp: 60 capsule, Rfl: 0    FLUoxetine (PROZAC) 20 MG capsule, Take 1 capsule by mouth daily, Disp: 30 capsule, Rfl: 3    Multiple Vitamins-Minerals (CENTRUM SILVER 50+MEN) TABS, Take 1 tablet by mouth daily, Disp: , Rfl:     Social History     Tobacco Use    Smoking status: Every Day     Packs/day: 1.00     Years: 40.00     Pack years: 40.00     Types: Cigarettes    Smokeless tobacco: Never   Substance Use Topics    Alcohol use: Yes     Comment: occasional        Review of Systems:   Focused review of systems was performed, and negative as pertinent to diagnosis, except as stated in HPI. Physical Exam  Constitutional:       Appearance: Normal appearance. Pulmonary:      Effort: Pulmonary effort is normal.   Neurological:      Mental Status: alert.    Psychiatric:         Attention and Perception: Attention and perception normal. Mood and Affect: Mood and affect normal.   Cardiovascular:      Rate: Normal rate. ASA: 3          Mallampati: 3       Patient's current physical status, medications, medical history, and HPI have been reviewed and updated as appropriate on this date: 11/28/22    Risk/Benefit(s): The risks, benefits, alternatives, and potential complications have been discussed with the patient/family and informed consent has been obtained for the procedure/sedation. Diagnosis:   1.  Lumbosacral spondylosis without myelopathy            Plan:   Bilateral lumbar RFA at L3 / L4 / L5 and sacral ala        Melisa Pantoja MD

## 2022-12-15 ENCOUNTER — HOSPITAL ENCOUNTER (OUTPATIENT)
Dept: PAIN MANAGEMENT | Age: 57
Discharge: HOME | End: 2022-12-15
Payer: COMMERCIAL

## 2022-12-15 VITALS
OXYGEN SATURATION: 98 % | DIASTOLIC BLOOD PRESSURE: 78 MMHG | BODY MASS INDEX: 29.64 KG/M2 | WEIGHT: 151 LBS | HEART RATE: 68 BPM | SYSTOLIC BLOOD PRESSURE: 119 MMHG | HEIGHT: 60 IN

## 2022-12-15 DIAGNOSIS — M47.817 LUMBOSACRAL SPONDYLOSIS WITHOUT MYELOPATHY: ICD-10-CM

## 2022-12-15 DIAGNOSIS — M54.16 LUMBAR RADICULOPATHY: Primary | ICD-10-CM

## 2022-12-15 PROCEDURE — 99213 OFFICE O/P EST LOW 20 MIN: CPT

## 2022-12-15 PROCEDURE — 99212 OFFICE O/P EST SF 10 MIN: CPT | Performed by: ANESTHESIOLOGY

## 2022-12-15 ASSESSMENT — ENCOUNTER SYMPTOMS
SHORTNESS OF BREATH: 0
NAUSEA: 0
WHEEZING: 0
DIARRHEA: 0
CONSTIPATION: 0
VOMITING: 0
BACK PAIN: 1
CHOKING: 0

## 2022-12-15 ASSESSMENT — PAIN SCALES - GENERAL: PAINLEVEL_OUTOF10: 0

## 2022-12-15 ASSESSMENT — PAIN DESCRIPTION - LOCATION: LOCATION: BACK

## 2022-12-15 NOTE — PROGRESS NOTES
The patient is a 61 y. o. Non- / non  male. Chief Complaint   Patient presents with    Back Pain        S/P: :Radiofrequency ablation of median branches at the Transverse processes of L3, L4, L5 AND SACRAL ALA for L3/4, L4/5 AND L5/S1 facet joints on Bilateral under fluoroscopic guidance with IV sedation. Patient was seen in clinic this afternoon for follow up after RFA on 11/28/2022. He reports 100% improvement of his low back pain. He states that he is going back to work on Monday to really \"test it out. \" He has not done any exercise or long-walking since the procedure. Outcome   Any improvement of activity? Yes   Any side effects (appetite,leg cramping,facial fleshing):no   Increase of pain:  No  Pain score Today:  0  % of pain relief:100%  Pain diary (medial branch block): No    Hemoglobin A1C   Date Value Ref Range Status   09/22/2022 4.9 4.0 - 6.0 % Final             Past Medical History:   Diagnosis Date    Arthritis         Past Surgical History:   Procedure Laterality Date    ANTERIOR CRUCIATE LIGAMENT REPAIR Right     PAIN MANAGEMENT PROCEDURE  10/2022    test block series    SPINAL FUSION         Social History     Socioeconomic History    Marital status:    Tobacco Use    Smoking status: Every Day     Packs/day: 1.00     Years: 40.00     Pack years: 40.00     Types: Cigarettes    Smokeless tobacco: Never   Substance and Sexual Activity    Alcohol use: Yes     Comment: occasional     Drug use: Yes     Types: Marijuana (Weed)       No family history on file. Allergies   Allergen Reactions    Adhesive Tape Rash       There were no vitals filed for this visit.     Current Outpatient Medications   Medication Sig Dispense Refill    buPROPion (WELLBUTRIN XL) 300 MG extended release tablet Take 300 mg by mouth every morning      traZODone (DESYREL) 50 MG tablet Take 1 tablet by mouth nightly 30 tablet 3    gabapentin (NEURONTIN) 100 MG capsule Take 1 capsule by mouth nightly for 60 days. 60 capsule 0    FLUoxetine (PROZAC) 20 MG capsule Take 1 capsule by mouth daily 30 capsule 3    Multiple Vitamins-Minerals (CENTRUM SILVER 50+MEN) TABS Take 1 tablet by mouth daily       No current facility-administered medications for this encounter. Review of Systems   Constitutional:  Negative for chills, fatigue and fever. Respiratory:  Negative for choking, shortness of breath and wheezing. Cardiovascular:  Negative for chest pain and leg swelling. Gastrointestinal:  Negative for constipation, diarrhea, nausea and vomiting. Musculoskeletal:  Positive for back pain. Neurological:  Negative for weakness and numbness. Objective:  General Appearance:  Comfortable, well-appearing, in no acute distress and not in pain. Vital signs: (most recent): Blood pressure 119/78, pulse 68, height (!) 5\" (0.127 m), weight 151 lb (68.5 kg), SpO2 98 %. Vital signs are normal.  No fever. Heart: Normal rate. Extremities: Normal range of motion. Neurological: Patient is alert and oriented to person, place and time. Normal strength. Assessment & Plan      1. Lumbar radiculopathy    2. Lumbosacral spondylosis without myelopathy        No orders of the defined types were placed in this encounter. No orders of the defined types were placed in this encounter.    - Patient quite pleased with the results of RFA  - He states that he does not know if he'll be back to our facility if he requires further intervention, as he is quite disgruntled with the long, drawn-out process that has led him to where he is today.   - Follow up as needed    Electronically signed by Heidy Uribe on 12/15/2022 at 2:32 PM    Attending Supervising Destin Stack MD, have seen and examined Dacia Purvis and personally performed and participated in the key or critical portions of the evaluation and management including personally performing the exam and medical decision making. I verify the accuracy of the documentation by the medical/PA student with the following additions/changes.      Additional Comments:   Pleasant 79-year-old man with history of chronic low back pain  Today he is here for follow-up after lumbar radiofrequency ablation  Patient did recent physical therapy and tried epidural injection with no benefit  Today he report after radiofrequency ablation he had noticed 100% improvement with improved range of motion  No side effect or complication  Happy and satisfied with the outcome  No further follow-up required at this time  Patient will call for repeat radiofrequency if symptoms return in future      Electronically signed by Tripp Jane MD on 12/15/2022 at 3:14 PM

## 2023-08-17 ENCOUNTER — HOSPITAL ENCOUNTER (OUTPATIENT)
Dept: PAIN MANAGEMENT | Age: 58
Discharge: HOME | End: 2023-08-17
Payer: COMMERCIAL

## 2023-08-17 VITALS — DIASTOLIC BLOOD PRESSURE: 94 MMHG | SYSTOLIC BLOOD PRESSURE: 136 MMHG | OXYGEN SATURATION: 97 % | HEART RATE: 84 BPM

## 2023-08-17 DIAGNOSIS — M47.817 LUMBOSACRAL SPONDYLOSIS WITHOUT MYELOPATHY: Primary | Chronic | ICD-10-CM

## 2023-08-17 PROCEDURE — 99213 OFFICE O/P EST LOW 20 MIN: CPT

## 2023-08-17 PROCEDURE — 99213 OFFICE O/P EST LOW 20 MIN: CPT | Performed by: ANESTHESIOLOGY

## 2023-08-17 ASSESSMENT — ENCOUNTER SYMPTOMS
BACK PAIN: 1
GASTROINTESTINAL NEGATIVE: 1

## 2023-08-17 NOTE — PROGRESS NOTES
The patient is a 61 y. o. Non- / non  male. Chief Complaint   Patient presents with    Back Pain      -62-year-old man with history of chronic low back pain  He was diagnosed with lumbar facet mediated pain failed conservative measures had successful diagnostic and confirmatory lumbar medial branch nerve block  Underwent lumbar radiofrequency ablation November 2022  Procedure provided him near 100% improvement in axial back pain with improved activity tolerance  Relief lasted for more than 8 months  Pain started coming back last month with excessive activity predominantly located in the axial lower back area  Intermittent extension down right leg  Describes it as aching stabbing sensation that aggravates with routine activity  Patient is interested in repeat lumbar radiofrequency ablation  He has been doing home exercises and have tried NSAIDs      Patient is here today for: back pain  Pain level: 4/10  Character: burning, spasms  Radiating: yes R Leg  Weakness or numbness: neither  Aggravating Factors: bending  Alleviating Factors: laying down, ice  Constant or intermitting: constant  Bladder/bowel loss: no      Past Medical History:   Diagnosis Date    Arthritis         Past Surgical History:   Procedure Laterality Date    ANTERIOR CRUCIATE LIGAMENT REPAIR Right     PAIN MANAGEMENT PROCEDURE  10/2022    test block series    SPINAL FUSION         Social History     Socioeconomic History    Marital status:      Spouse name: None    Number of children: None    Years of education: None    Highest education level: None   Tobacco Use    Smoking status: Every Day     Packs/day: 1.00     Years: 40.00     Pack years: 40.00     Types: Cigarettes    Smokeless tobacco: Never   Substance and Sexual Activity    Alcohol use: Yes     Comment: occasional     Drug use: Yes     Types: Marijuana Jitendra Inch)       History reviewed. No pertinent family history.     Allergies   Allergen Reactions    Adhesive Tape Rash

## 2023-08-17 NOTE — H&P (VIEW-ONLY)
baseline  Similar to previous pain  Suggestive of lumbar facet mediated pain  We will schedule for repeat lumbar RFA    If this do not provide adequate relief  Consider obtaining new MRI lumbar spine as last imaging was 2021    Orders Placed This Encounter   Procedures    FACET JOINT L/S SINGLE      No orders of the defined types were placed in this encounter.            Electronically signed by Caterina Buchanan MD on 8/17/2023 at 10:39 AM

## 2023-09-01 ENCOUNTER — APPOINTMENT (OUTPATIENT)
Dept: GENERAL RADIOLOGY | Age: 58
End: 2023-09-01
Payer: OTHER MISCELLANEOUS

## 2023-09-01 ENCOUNTER — APPOINTMENT (OUTPATIENT)
Dept: CT IMAGING | Age: 58
End: 2023-09-01
Payer: OTHER MISCELLANEOUS

## 2023-09-01 ENCOUNTER — HOSPITAL ENCOUNTER (EMERGENCY)
Age: 58
Discharge: HOME | End: 2023-09-01
Attending: EMERGENCY MEDICINE
Payer: OTHER MISCELLANEOUS

## 2023-09-01 VITALS
RESPIRATION RATE: 14 BRPM | HEIGHT: 71 IN | BODY MASS INDEX: 21 KG/M2 | SYSTOLIC BLOOD PRESSURE: 136 MMHG | OXYGEN SATURATION: 96 % | DIASTOLIC BLOOD PRESSURE: 80 MMHG | TEMPERATURE: 97.7 F | WEIGHT: 150 LBS | HEART RATE: 65 BPM
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
## 2023-09-01 LAB
ABO + RH BLD: NORMAL
ANION GAP SERPL CALCULATED.3IONS-SCNC: 16 MMOL/L (ref 9–17)
ANION GAP SERPL CALCULATED.3IONS-SCNC: NORMAL MMOL/L
ARM BAND NUMBER: NORMAL
ARTERIAL PATENCY WRIST A: NORMAL
BDY SITE: NORMAL
BLOOD BANK SAMPLE EXPIRATION: NORMAL
BLOOD BANK SPECIMEN: NORMAL
BLOOD GROUP ANTIBODIES SERPL: NEGATIVE
BODY TEMPERATURE: 37
BODY TEMPERATURE: NORMAL
BREATHS.MECHANICAL ON VENT: NORMAL
BUN SERPL-MCNC: 18 MG/DL (ref 6–20)
BUN SERPL-MCNC: NORMAL MG/DL
CHLORIDE SERPL-SCNC: 103 MMOL/L (ref 98–107)
CHLORIDE SERPL-SCNC: NORMAL MMOL/L
CO2 SERPL-SCNC: 20 MMOL/L (ref 20–31)
CO2 SERPL-SCNC: NORMAL MMOL/L
COHGB MFR BLD: 5.4 % (ref 0–5)
COHGB MFR BLD: NORMAL %
CREAT SERPL-MCNC: 0.9 MG/DL (ref 0.7–1.2)
CREAT SERPL-MCNC: NORMAL MG/DL
ERYTHROCYTE [DISTWIDTH] IN BLOOD BY AUTOMATED COUNT: 11.9 % (ref 11.8–14.4)
ERYTHROCYTE [DISTWIDTH] IN BLOOD BY AUTOMATED COUNT: NORMAL %
ETHANOL PERCENT: <0.01 %
ETHANOL PERCENT: NORMAL %
ETHANOLAMINE SERPL-MCNC: <10 MG/DL
ETHANOLAMINE SERPL-MCNC: NORMAL MG/DL
FIO2 ON VENT: ABNORMAL %
FIO2 ON VENT: NORMAL %
GAS FLOW.O2 O2 DELIVERY SYS: NORMAL L/MIN
GAS FLOW.O2 SETTING OXYMISER: NORMAL L/MIN
GFR SERPL CREATININE-BSD FRML MDRD: >60 ML/MIN/1.73M2
GFR SERPL CREATININE-BSD FRML MDRD: NORMAL ML/MIN/1.73M2
GLUCOSE SERPL-MCNC: 135 MG/DL (ref 70–99)
GLUCOSE SERPL-MCNC: NORMAL MG/DL
HCG SERPL QL: NORMAL
HCO3 VENOUS: 22 MMOL/L (ref 24–30)
HCO3 VENOUS: NORMAL MMOL/L (ref 24–30)
HCT VFR BLD AUTO: 41.6 % (ref 40.7–50.3)
HCT VFR BLD AUTO: NORMAL %
HGB BLD-MCNC: 14.4 G/DL (ref 13–17)
HGB BLD-MCNC: NORMAL G/DL
HGB BLDMV-MCNC: NORMAL G/DL (ref 12–16)
INR PPP: 1
INR PPP: NORMAL
MCH RBC QN AUTO: 33.6 PG (ref 25.2–33.5)
MCH RBC QN AUTO: NORMAL PG
MCHC RBC AUTO-ENTMCNC: 34.6 G/DL (ref 28.4–34.8)
MCHC RBC AUTO-ENTMCNC: NORMAL G/DL
MCV RBC AUTO: 97 FL (ref 82.6–102.9)
MCV RBC AUTO: NORMAL FL
METHEMOGLOBIN: NORMAL %
NEGATIVE BASE EXCESS, VEN: 1.8 MMOL/L (ref 0–2)
NEGATIVE BASE EXCESS, VEN: NORMAL MMOL/L (ref 0–2)
NOTIFICATION TIME: NORMAL
NOTIFICATION: NORMAL
NRBC BLD-RTO: 0 PER 100 WBC
NRBC BLD-RTO: NORMAL PER 100 WBC
O2 SAT, VEN: 95.3 % (ref 60–85)
O2 SAT, VEN: NORMAL %
OXYHGB MFR BLD: NORMAL % (ref 95–98)
PARTIAL THROMBOPLASTIN TIME: 26.2 SEC (ref 23–36.5)
PARTIAL THROMBOPLASTIN TIME: NORMAL SEC
PCO2, VEN, TEMP ADJ: NORMAL MMHG (ref 39–55)
PCO2, VEN: 36.5 MM HG (ref 39–55)
PCO2, VEN: NORMAL MM HG (ref 39–55)
PEEP RESPIRATORY: NORMAL CM[H2O]
PH VENOUS: 7.4 (ref 7.32–7.42)
PH VENOUS: NORMAL (ref 7.32–7.42)
PH, VEN, TEMP ADJ: NORMAL (ref 7.32–7.42)
PLATELET # BLD AUTO: 293 K/UL (ref 138–453)
PLATELET # BLD AUTO: NORMAL K/UL
PLATELET, FLUORESCENCE: NORMAL K/UL (ref 138–453)
PLATELETS.RETICULATED NFR BLD AUTO: NORMAL % (ref 1.1–10.3)
PMV BLD AUTO: 9.5 FL (ref 8.1–13.5)
PMV BLD AUTO: NORMAL FL
PO2, VEN, TEMP ADJ: NORMAL MMHG (ref 30–50)
PO2, VEN: 76.4 MM HG (ref 30–50)
PO2, VEN: NORMAL MM HG (ref 30–50)
POSITIVE BASE EXCESS, VEN: NORMAL MMOL/L (ref 0–2)
POTASSIUM SERPL-SCNC: 3.7 MMOL/L (ref 3.7–5.3)
POTASSIUM SERPL-SCNC: NORMAL MMOL/L
PROTHROMBIN TIME: 13.1 SEC (ref 11.7–14.9)
PROTHROMBIN TIME: NORMAL SEC
PT. POSITION: NORMAL
RBC # BLD AUTO: 4.29 M/UL (ref 4.21–5.77)
RBC # BLD AUTO: NORMAL M/UL
RESPIRATORY RATE: NORMAL
SODIUM SERPL-SCNC: 139 MMOL/L (ref 135–144)
SODIUM SERPL-SCNC: NORMAL MMOL/L
TEXT FOR RESPIRATORY: NORMAL
TOTAL RATE: NORMAL
VENTILATION MODE VENT: NORMAL
VT: NORMAL
WBC OTHER # BLD: 12 K/UL (ref 3.5–11.3)
WBC OTHER # BLD: NORMAL K/UL

## 2023-09-01 PROCEDURE — 73630 X-RAY EXAM OF FOOT: CPT

## 2023-09-01 PROCEDURE — 86901 BLOOD TYPING SEROLOGIC RH(D): CPT

## 2023-09-01 PROCEDURE — 86850 RBC ANTIBODY SCREEN: CPT

## 2023-09-01 PROCEDURE — 85027 COMPLETE CBC AUTOMATED: CPT

## 2023-09-01 PROCEDURE — 72125 CT NECK SPINE W/O DYE: CPT

## 2023-09-01 PROCEDURE — 84520 ASSAY OF UREA NITROGEN: CPT

## 2023-09-01 PROCEDURE — 85610 PROTHROMBIN TIME: CPT

## 2023-09-01 PROCEDURE — 82565 ASSAY OF CREATININE: CPT

## 2023-09-01 PROCEDURE — 96375 TX/PRO/DX INJ NEW DRUG ADDON: CPT

## 2023-09-01 PROCEDURE — 6360000002 HC RX W HCPCS: Performed by: EMERGENCY MEDICINE

## 2023-09-01 PROCEDURE — G0480 DRUG TEST DEF 1-7 CLASSES: HCPCS

## 2023-09-01 PROCEDURE — 96374 THER/PROPH/DIAG INJ IV PUSH: CPT

## 2023-09-01 PROCEDURE — 6360000004 HC RX CONTRAST MEDICATION: Performed by: EMERGENCY MEDICINE

## 2023-09-01 PROCEDURE — 70450 CT HEAD/BRAIN W/O DYE: CPT

## 2023-09-01 PROCEDURE — 82805 BLOOD GASES W/O2 SATURATION: CPT

## 2023-09-01 PROCEDURE — 86900 BLOOD TYPING SEROLOGIC ABO: CPT

## 2023-09-01 PROCEDURE — 36415 COLL VENOUS BLD VENIPUNCTURE: CPT

## 2023-09-01 PROCEDURE — 99285 EMERGENCY DEPT VISIT HI MDM: CPT

## 2023-09-01 PROCEDURE — 71260 CT THORAX DX C+: CPT

## 2023-09-01 PROCEDURE — 85730 THROMBOPLASTIN TIME PARTIAL: CPT

## 2023-09-01 PROCEDURE — 84703 CHORIONIC GONADOTROPIN ASSAY: CPT

## 2023-09-01 PROCEDURE — 73610 X-RAY EXAM OF ANKLE: CPT

## 2023-09-01 PROCEDURE — 80051 ELECTROLYTE PANEL: CPT

## 2023-09-01 PROCEDURE — 82947 ASSAY GLUCOSE BLOOD QUANT: CPT

## 2023-09-01 PROCEDURE — 6370000000 HC RX 637 (ALT 250 FOR IP): Performed by: STUDENT IN AN ORGANIZED HEALTH CARE EDUCATION/TRAINING PROGRAM

## 2023-09-01 RX ORDER — ACETAMINOPHEN 500 MG
1000 TABLET ORAL EVERY 6 HOURS PRN
Qty: 80 TABLET | Refills: 0 | Status: SHIPPED | OUTPATIENT
Start: 2023-09-01 | End: 2023-09-11

## 2023-09-01 RX ORDER — IBUPROFEN 800 MG/1
800 TABLET ORAL EVERY 8 HOURS PRN
Qty: 30 TABLET | Refills: 0 | Status: SHIPPED | OUTPATIENT
Start: 2023-09-01 | End: 2023-09-11

## 2023-09-01 RX ORDER — FENTANYL CITRATE 50 UG/ML
50 INJECTION, SOLUTION INTRAMUSCULAR; INTRAVENOUS ONCE
Status: COMPLETED | OUTPATIENT
Start: 2023-09-01 | End: 2023-09-01

## 2023-09-01 RX ORDER — ONDANSETRON 2 MG/ML
4 INJECTION INTRAMUSCULAR; INTRAVENOUS ONCE
Status: COMPLETED | OUTPATIENT
Start: 2023-09-01 | End: 2023-09-01

## 2023-09-01 RX ORDER — DOXYCYCLINE HYCLATE 100 MG
100 TABLET ORAL ONCE
Status: COMPLETED | OUTPATIENT
Start: 2023-09-01 | End: 2023-09-01

## 2023-09-01 RX ORDER — OXYCODONE HYDROCHLORIDE 5 MG/1
5 TABLET ORAL ONCE
Status: COMPLETED | OUTPATIENT
Start: 2023-09-01 | End: 2023-09-01

## 2023-09-01 RX ORDER — MORPHINE SULFATE 4 MG/ML
4 INJECTION, SOLUTION INTRAMUSCULAR; INTRAVENOUS ONCE
Status: COMPLETED | OUTPATIENT
Start: 2023-09-01 | End: 2023-09-01

## 2023-09-01 RX ORDER — LIDOCAINE 50 MG/G
1 PATCH TOPICAL DAILY
Qty: 5 PATCH | Refills: 0 | Status: SHIPPED | OUTPATIENT
Start: 2023-09-01 | End: 2023-09-06

## 2023-09-01 RX ORDER — CYCLOBENZAPRINE HCL 5 MG
5 TABLET ORAL 2 TIMES DAILY PRN
Qty: 10 TABLET | Refills: 0 | Status: SHIPPED | OUTPATIENT
Start: 2023-09-01 | End: 2023-09-11

## 2023-09-01 RX ORDER — DOXYCYCLINE HYCLATE 100 MG
100 TABLET ORAL 2 TIMES DAILY
Qty: 14 TABLET | Refills: 0 | Status: SHIPPED | OUTPATIENT
Start: 2023-09-01 | End: 2023-09-08

## 2023-09-01 RX ADMIN — IOPAMIDOL 75 ML: 755 INJECTION, SOLUTION INTRAVENOUS at 17:01

## 2023-09-01 RX ADMIN — ONDANSETRON 4 MG: 2 INJECTION INTRAMUSCULAR; INTRAVENOUS at 15:20

## 2023-09-01 RX ADMIN — MORPHINE SULFATE 4 MG: 4 INJECTION INTRAVENOUS at 15:20

## 2023-09-01 RX ADMIN — OXYCODONE HYDROCHLORIDE 5 MG: 5 TABLET ORAL at 19:16

## 2023-09-01 RX ADMIN — DOXYCYCLINE HYCLATE 100 MG: 100 TABLET, COATED ORAL at 19:11

## 2023-09-01 RX ADMIN — FENTANYL CITRATE 50 MCG: 50 INJECTION, SOLUTION INTRAMUSCULAR; INTRAVENOUS at 15:58

## 2023-09-01 ASSESSMENT — PAIN DESCRIPTION - ORIENTATION
ORIENTATION: LEFT
ORIENTATION: LEFT

## 2023-09-01 ASSESSMENT — PAIN - FUNCTIONAL ASSESSMENT: PAIN_FUNCTIONAL_ASSESSMENT: 0-10

## 2023-09-01 ASSESSMENT — ENCOUNTER SYMPTOMS
NAUSEA: 0
DIARRHEA: 0
SHORTNESS OF BREATH: 0
VOMITING: 0
ABDOMINAL PAIN: 0
BACK PAIN: 0

## 2023-09-01 ASSESSMENT — PAIN SCALES - GENERAL
PAINLEVEL_OUTOF10: 7
PAINLEVEL_OUTOF10: 7

## 2023-09-01 ASSESSMENT — PAIN DESCRIPTION - LOCATION
LOCATION: HEAD;HIP;LEG
LOCATION: FOOT

## 2023-09-01 ASSESSMENT — PAIN DESCRIPTION - FREQUENCY: FREQUENCY: CONTINUOUS

## 2023-09-01 ASSESSMENT — PAIN DESCRIPTION - DESCRIPTORS
DESCRIPTORS: ACHING
DESCRIPTORS: ACHING

## 2023-09-01 ASSESSMENT — PAIN DESCRIPTION - PAIN TYPE: TYPE: ACUTE PAIN

## 2023-09-01 NOTE — ED PROVIDER NOTES
[address]  Emergency Department  Emergency Medicine Resident Sign-out     Care of Lety Shannon was assumed from Dr. Damaris Ireland and is being seen for [facility]  . The patient's initial evaluation and plan have been discussed with the prior provider who initially evaluated the patient. EMERGENCY DEPARTMENT COURSE / MEDICAL DECISION MAKING:       MEDICATIONS GIVEN:  Orders Placed This Encounter   Medications    morphine injection 4 mg    ondansetron (ZOFRAN) injection 4 mg    fentaNYL (SUBLIMAZE) injection 50 mcg    iopamidol (ISOVUE-370) 76 % injection 75 mL    doxycycline hyclate (VIBRA-TABS) tablet 100 mg     Order Specific Question:   Antimicrobial Indications     Answer:   Skin and Soft Tissue Infection    acetaminophen (TYLENOL) 500 MG tablet     Sig: Take 2 tablets by mouth every 6 hours as needed for Pain     Dispense:  80 tablet     Refill:  0    ibuprofen (ADVIL;MOTRIN) 800 MG tablet     Sig: Take 1 tablet by mouth every 8 hours as needed for Pain     Dispense:  30 tablet     Refill:  0    cyclobenzaprine (FLEXERIL) 5 MG tablet     Sig: Take 1 tablet by mouth 2 times daily as needed for Muscle spasms     Dispense:  10 tablet     Refill:  0    lidocaine (LIDODERM) 5 %     Sig: Place 1 patch onto the skin daily for 5 days 12 hours on, 12 hours off.      Dispense:  5 patch     Refill:  0    doxycycline hyclate (VIBRA-TABS) 100 MG tablet     Sig: Take 1 tablet by mouth 2 times daily for 7 days     Dispense:  14 tablet     Refill:  0    oxyCODONE (ROXICODONE) immediate release tablet 5 mg       LABS / RADIOLOGY:     Results for orders placed or performed during the hospital encounter of 09/01/23   TRAUMA PANEL   Result Value Ref Range    Ethanol DUPLICATE ORDER mg/dL    Ethanol percent DUPLICATE ORDER %    Blood Bank Specimen BILL FOR SERVICES PERFORMED     BUN DUPLICATE ORDER mg/dL    WBC NO SAMPLE RECEIVED k/uL    RBC NO SAMPLE RECEIVED m/uL    Hemoglobin NO SAMPLE

## 2023-09-01 NOTE — ED NOTES
Pt respirations are even and unlabored, pt is alert and oriented X 4, speaking in complete sentences, bed is in the lowest position, call light is within reach, NAD noted. Will continue to follow plan of care.         Eliazar Mar RN  09/01/23 1950

## 2023-09-01 NOTE — ED NOTES
Pt respirations are even and unlabored, pt is alert and oriented X 4, speaking in complete sentences, bed is in the lowest position, call light is within reach, NAD noted. Will continue to follow plan of care.         Gómez Frank RN  09/01/23 0547

## 2023-09-01 NOTE — PROGRESS NOTES
[facility] - [address] [place] [address]  PROGRESS NOTE    Shift date: 09/01/23  Shift day: Friday   Shift # 2    Room # 14/14   Name: Angel Oneill                Protestant:    Place of [facility]:     Referral: Routine Visit    Admit Date & Time: 9/1/2023  3:00 PM    Assessment:  Angel Oneill is a 61 y.o. male in the hospital       Intervention:  Writer introduced self and title as      Outcome:  Patient declined  visit at this time. Plan:  Chaplains will remain available to offer spiritual and emotional support as needed.       Electronically signed by Ioana Sky on 9/1/2023 at 6:22 PM.  [address] [place] [address]  [phone]

## 2023-09-01 NOTE — ED NOTES
The following labs were labeled with appropriate pt sticker and tubed to lab:     [] Blue     [] Lavender   [] on ice  [] Green/yellow  [x] Green/black [] on ice  [] Ahmad Bebe  [] on ice  [x] Yellow  [] Red  [x] Type/ Screen  [] ABG  [] VBG    [] COVID-19 swab    [] Rapid  [] PCR  [] Flu swab  [] Peds Viral Panel     [] Urine Sample  [] Fecal Sample  [] Pelvic Cultures  [] Blood Cultures  [] X 2  [] STREP Cultures       Pastora Olivas RN  09/01/23 5926

## 2023-09-01 NOTE — ED NOTES
Pt respirations are even and unlabored, pt is alert and oriented X 4, speaking in complete sentences, bed is in the lowest position, call light is within reach, NAD noted. Will continue to follow plan of care.         Sunny Valderrama RN  09/01/23 7365
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
## 2023-09-01 NOTE — ED NOTES
Pt respirations are even and unlabored, pt is alert and oriented X 4, speaking in complete sentences, bed is in the lowest position, call light is within reach, NAD noted. Will continue to follow plan of care.         Cristiana Christianson RN  09/01/23 4068
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
## 2023-09-11 SDOH — HEALTH STABILITY: PHYSICAL HEALTH: ON AVERAGE, HOW MANY MINUTES DO YOU ENGAGE IN EXERCISE AT THIS LEVEL?: 90 MIN

## 2023-09-11 SDOH — HEALTH STABILITY: PHYSICAL HEALTH: ON AVERAGE, HOW MANY DAYS PER WEEK DO YOU ENGAGE IN MODERATE TO STRENUOUS EXERCISE (LIKE A BRISK WALK)?: 5 DAYS

## 2023-09-13 ENCOUNTER — HOSPITAL ENCOUNTER (OUTPATIENT)
Dept: PAIN MANAGEMENT | Facility: CLINIC | Age: 58
Discharge: HOME | End: 2023-09-13
Payer: COMMERCIAL

## 2023-09-13 VITALS
OXYGEN SATURATION: 97 % | TEMPERATURE: 97.6 F | DIASTOLIC BLOOD PRESSURE: 92 MMHG | RESPIRATION RATE: 18 BRPM | HEIGHT: 72 IN | WEIGHT: 170 LBS | SYSTOLIC BLOOD PRESSURE: 152 MMHG | HEART RATE: 78 BPM | BODY MASS INDEX: 23.03 KG/M2

## 2023-09-13 DIAGNOSIS — M47.817 LUMBOSACRAL SPONDYLOSIS WITHOUT MYELOPATHY: Primary | Chronic | ICD-10-CM

## 2023-09-13 DIAGNOSIS — R52 PAIN MANAGEMENT: ICD-10-CM

## 2023-09-13 PROCEDURE — 2500000003 HC RX 250 WO HCPCS: Performed by: ANESTHESIOLOGY

## 2023-09-13 PROCEDURE — 64636 DESTROY L/S FACET JNT ADDL: CPT

## 2023-09-13 PROCEDURE — 64635 DESTROY LUMB/SAC FACET JNT: CPT

## 2023-09-13 PROCEDURE — 6360000002 HC RX W HCPCS: Performed by: ANESTHESIOLOGY

## 2023-09-13 RX ORDER — LIDOCAINE HYDROCHLORIDE 10 MG/ML
INJECTION, SOLUTION EPIDURAL; INFILTRATION; INTRACAUDAL; PERINEURAL
Status: COMPLETED | OUTPATIENT
Start: 2023-09-13 | End: 2023-09-13

## 2023-09-13 RX ORDER — FENTANYL CITRATE 50 UG/ML
INJECTION, SOLUTION INTRAMUSCULAR; INTRAVENOUS
Status: COMPLETED | OUTPATIENT
Start: 2023-09-13 | End: 2023-09-13

## 2023-09-13 RX ORDER — LIDOCAINE HYDROCHLORIDE 40 MG/ML
INJECTION, SOLUTION RETROBULBAR; TOPICAL
Status: COMPLETED | OUTPATIENT
Start: 2023-09-13 | End: 2023-09-13

## 2023-09-13 RX ORDER — MIDAZOLAM HYDROCHLORIDE 2 MG/2ML
INJECTION, SOLUTION INTRAMUSCULAR; INTRAVENOUS
Status: COMPLETED | OUTPATIENT
Start: 2023-09-13 | End: 2023-09-13

## 2023-09-13 RX ADMIN — MIDAZOLAM HYDROCHLORIDE 2 MG: 1 INJECTION, SOLUTION INTRAMUSCULAR; INTRAVENOUS at 13:25

## 2023-09-13 RX ADMIN — FENTANYL CITRATE 50 MCG: 50 INJECTION, SOLUTION INTRAMUSCULAR; INTRAVENOUS at 13:35

## 2023-09-13 RX ADMIN — FENTANYL CITRATE 50 MCG: 50 INJECTION, SOLUTION INTRAMUSCULAR; INTRAVENOUS at 13:24

## 2023-09-13 RX ADMIN — LIDOCAINE HYDROCHLORIDE 5 ML: 10 INJECTION, SOLUTION EPIDURAL; INFILTRATION; INTRACAUDAL at 13:25

## 2023-09-13 RX ADMIN — LIDOCAINE HYDROCHLORIDE 5 ML: 10 INJECTION, SOLUTION EPIDURAL; INFILTRATION; INTRACAUDAL at 13:33

## 2023-09-13 RX ADMIN — LIDOCAINE HYDROCHLORIDE 5 ML: 40 SOLUTION RETROBULBAR; TOPICAL at 13:29

## 2023-09-13 ASSESSMENT — PAIN - FUNCTIONAL ASSESSMENT: PAIN_FUNCTIONAL_ASSESSMENT: 0-10

## 2023-09-13 ASSESSMENT — PAIN SCALES - GENERAL: PAINLEVEL_OUTOF10: 1

## 2023-09-13 ASSESSMENT — PAIN DESCRIPTION - DESCRIPTORS: DESCRIPTORS: ACHING;SHARP;SHOOTING

## 2023-09-13 NOTE — INTERVAL H&P NOTE
Update History & Physical    The patient's History and Physical of August 17, 2023 was reviewed with the patient and I examined the patient. There was no change. The surgical site was confirmed by the patient and me. Plan: The risks, benefits, expected outcome, and alternative to the recommended procedure have been discussed with the patient. Patient understands and wants to proceed with the procedure.      ASA 3  MP 3  Electronically signed by Joseph Oliver MD on 9/13/2023 at 1:03 PM

## 2023-09-13 NOTE — DISCHARGE INSTRUCTIONS
Discharge Instructions following Sedation or Anesthesia:  You have  received  a sedative/anesthetic therefore, you should not consume any alcoholic beverages for minimum of 12 hours. Do not drive or operate machinery for 24 hours. Do not sign legal documents for 24 hours. Dizziness, drowsiness, and unsteadiness may occur. Rest when need to. Increase diet as tolerated. Keep up on fluids if diet allows. General Instructions:  Do not take a tub bath for 72 hours after procedure (this includes hot tubs and swimming pools). You may shower, but avoid hot water to injection site. Avoid strenuous activity TODAY especially if you experience dizziness. Remove band-aid the next day. Wash off any residual iodine   Do not use heat, heating pad, or any other heating device over the injection site for 3 days after the procedure. If you experience pain after your procedure, you may continue with your current pain medication as prescribed. (DO NOT INCREASE YOUR PAIN MEDICATION WITHOUT TALKING TO DOCTOR)  Soreness and pain at injection site is common, may use ice to reduce soreness.     Call Bev at [phone] if you experience:   Fever, chills or temperature over 100    Vomiting, Headache, persistent stiff neck, nausea, blurred vision   Difficulty in urinating or unable to urinate with 8 hours   Increase in weakness, numbness or loss of function   Increased redness, swelling or drainage at the injection site

## 2023-09-13 NOTE — OP NOTE
Preoperative Diagnosis: Lumbar spondylosis w/o myelopathy, chronic low back pain  Postoperative Diagnosis: Lumbar spondylosis w/o myelopathy, chronic low back pain  SEDATION: SEE SEDATION NOTE  BLOOD LOSS: NONE    Procedure Performed:  :Radiofrequency ablation of median branches at the Transverse processes of L3 / L4 / L5 AND SACRAL ALA for L3/4, L4/5 AND L5./S1 facet joints on Bilateral under fluoroscopic guidance with IV sedation    Procedure:    After starting an IV, the patient was taken to procedure room. The patient was placed in prone position and skin over the back was prepped and draped in sterile manner. Standard monitors were connected and vitals were monitored during the case and they remained stable during the procedure. A meaningful communication was kept up with the patient throughout the procedure. Then using fluoroscopy the junction of the transverse process of the target vertebra with the superior process of the facet joint was observed and the view was optimized. The skin and deep tissues were infiltrated with 2 ml of  1 % lidocaine. The RF canula with the 10 mm active tip was introduced through the skin wheal under fluoroscopy guidance such that the tip of the needle lies in the groove of the transverse process with the superior processes of the facet joint. Then a lateral and AP view of the lumbar spine was obtained to make sure the tip of needle is not in the neural foramen. Then electric impedence was checked to make sure it is acceptable. Then a sensory stimulus was applied at 50 Hz up to 0.5 volt and concordant pain symptoms were reproduced. Then a motor stimulus was applied at 2 Hz up to 2 volts or 3 x times the sensory stimulus and no motor stimulation was seen in lower extremities. Some multifidus stimulus was seen. Then after negative aspiration 1 ml of 4% lidocaine was injected through the needle at each level. The radiofrequency lesion was done at 85 degrees centigrade

## 2023-09-14 ENCOUNTER — OFFICE VISIT (OUTPATIENT)
Dept: PRIMARY CARE CLINIC | Age: 58
End: 2023-09-14
Payer: COMMERCIAL

## 2023-09-14 VITALS
SYSTOLIC BLOOD PRESSURE: 134 MMHG | RESPIRATION RATE: 16 BRPM | DIASTOLIC BLOOD PRESSURE: 82 MMHG | OXYGEN SATURATION: 95 % | WEIGHT: 168.2 LBS | BODY MASS INDEX: 22.78 KG/M2 | HEIGHT: 72 IN | HEART RATE: 101 BPM

## 2023-09-14 DIAGNOSIS — M47.817 LUMBOSACRAL SPONDYLOSIS WITHOUT MYELOPATHY: Chronic | ICD-10-CM

## 2023-09-14 DIAGNOSIS — Z12.5 SCREENING PSA (PROSTATE SPECIFIC ANTIGEN): ICD-10-CM

## 2023-09-14 DIAGNOSIS — R53.82 CHRONIC FATIGUE: ICD-10-CM

## 2023-09-14 DIAGNOSIS — Z13.220 ENCOUNTER FOR LIPID SCREENING FOR CARDIOVASCULAR DISEASE: ICD-10-CM

## 2023-09-14 DIAGNOSIS — Z00.00 ANNUAL PHYSICAL EXAM: Primary | ICD-10-CM

## 2023-09-14 DIAGNOSIS — Z13.6 ENCOUNTER FOR LIPID SCREENING FOR CARDIOVASCULAR DISEASE: ICD-10-CM

## 2023-09-14 PROCEDURE — 99386 PREV VISIT NEW AGE 40-64: CPT | Performed by: PHYSICIAN ASSISTANT

## 2023-09-14 SDOH — ECONOMIC STABILITY: FOOD INSECURITY: WITHIN THE PAST 12 MONTHS, THE FOOD YOU BOUGHT JUST DIDN'T LAST AND YOU DIDN'T HAVE MONEY TO GET MORE.: NEVER TRUE

## 2023-09-14 SDOH — ECONOMIC STABILITY: HOUSING INSECURITY
IN THE LAST 12 MONTHS, WAS THERE A TIME WHEN YOU DID NOT HAVE A STEADY PLACE TO SLEEP OR SLEPT IN A SHELTER (INCLUDING NOW)?: NO

## 2023-09-14 SDOH — ECONOMIC STABILITY: INCOME INSECURITY: HOW HARD IS IT FOR YOU TO PAY FOR THE VERY BASICS LIKE FOOD, HOUSING, MEDICAL CARE, AND HEATING?: NOT HARD AT ALL

## 2023-09-14 SDOH — ECONOMIC STABILITY: FOOD INSECURITY: WITHIN THE PAST 12 MONTHS, YOU WORRIED THAT YOUR FOOD WOULD RUN OUT BEFORE YOU GOT MONEY TO BUY MORE.: NEVER TRUE

## 2023-09-14 ASSESSMENT — ENCOUNTER SYMPTOMS
DIARRHEA: 0
COUGH: 0
CONSTIPATION: 0
VOMITING: 0
NAUSEA: 0
BACK PAIN: 0
SHORTNESS OF BREATH: 0
ABDOMINAL PAIN: 0
EYE PAIN: 0
RHINORRHEA: 0
SINUS PAIN: 0

## 2023-09-14 ASSESSMENT — PATIENT HEALTH QUESTIONNAIRE - PHQ9
SUM OF ALL RESPONSES TO PHQ QUESTIONS 1-9: 0
SUM OF ALL RESPONSES TO PHQ9 QUESTIONS 1 & 2: 0
SUM OF ALL RESPONSES TO PHQ QUESTIONS 1-9: 0
1. LITTLE INTEREST OR PLEASURE IN DOING THINGS: 0
SUM OF ALL RESPONSES TO PHQ QUESTIONS 1-9: 0
8. MOVING OR SPEAKING SO SLOWLY THAT OTHER PEOPLE COULD HAVE NOTICED. OR THE OPPOSITE, BEING SO FIGETY OR RESTLESS THAT YOU HAVE BEEN MOVING AROUND A LOT MORE THAN USUAL: 0
SUM OF ALL RESPONSES TO PHQ QUESTIONS 1-9: 0
7. TROUBLE CONCENTRATING ON THINGS, SUCH AS READING THE NEWSPAPER OR WATCHING TELEVISION: 0
4. FEELING TIRED OR HAVING LITTLE ENERGY: 0
9. THOUGHTS THAT YOU WOULD BE BETTER OFF DEAD, OR OF HURTING YOURSELF: 0
2. FEELING DOWN, DEPRESSED OR HOPELESS: 0
10. IF YOU CHECKED OFF ANY PROBLEMS, HOW DIFFICULT HAVE THESE PROBLEMS MADE IT FOR YOU TO DO YOUR WORK, TAKE CARE OF THINGS AT HOME, OR GET ALONG WITH OTHER PEOPLE: 0
6. FEELING BAD ABOUT YOURSELF - OR THAT YOU ARE A FAILURE OR HAVE LET YOURSELF OR YOUR FAMILY DOWN: 0
5. POOR APPETITE OR OVEREATING: 0
3. TROUBLE FALLING OR STAYING ASLEEP: 0
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
## 2023-09-15 ENCOUNTER — HOSPITAL ENCOUNTER (OUTPATIENT)
Age: 58
Discharge: HOME | End: 2023-09-15
Payer: COMMERCIAL

## 2023-09-15 DIAGNOSIS — Z13.6 ENCOUNTER FOR LIPID SCREENING FOR CARDIOVASCULAR DISEASE: ICD-10-CM

## 2023-09-15 DIAGNOSIS — Z13.220 ENCOUNTER FOR LIPID SCREENING FOR CARDIOVASCULAR DISEASE: ICD-10-CM

## 2023-09-15 DIAGNOSIS — Z12.5 SCREENING PSA (PROSTATE SPECIFIC ANTIGEN): ICD-10-CM

## 2023-09-15 DIAGNOSIS — R53.82 CHRONIC FATIGUE: ICD-10-CM

## 2023-09-15 LAB
CHOLEST SERPL-MCNC: 197 MG/DL
CHOLESTEROL/HDL RATIO: 3.5
GLUCOSE P FAST SERPL-MCNC: 115 MG/DL (ref 70–99)
HDLC SERPL-MCNC: 56 MG/DL
LDLC SERPL CALC-MCNC: 120 MG/DL (ref 0–130)
TRIGL SERPL-MCNC: 106 MG/DL

## 2023-09-15 PROCEDURE — 80061 LIPID PANEL: CPT

## 2023-09-15 PROCEDURE — 82947 ASSAY GLUCOSE BLOOD QUANT: CPT

## 2023-09-15 PROCEDURE — 84270 ASSAY OF SEX HORMONE GLOBUL: CPT

## 2023-09-15 PROCEDURE — 36415 COLL VENOUS BLD VENIPUNCTURE: CPT

## 2023-09-15 PROCEDURE — 84403 ASSAY OF TOTAL TESTOSTERONE: CPT

## 2023-09-15 PROCEDURE — G0103 PSA SCREENING: HCPCS

## 2023-09-16 LAB
PSA SERPL-MCNC: 0.61 NG/ML
SHBG SERPL-SCNC: 54 NMOL/L (ref 11–80)
TESTOST FREE MFR SERPL: 61.5 PG/ML (ref 47–244)
TESTOST SERPL-MCNC: 418 NG/DL (ref 220–1000)

## 2023-09-18 DIAGNOSIS — R73.09 ELEVATED GLUCOSE: Primary | ICD-10-CM

## 2023-09-19 ENCOUNTER — HOSPITAL ENCOUNTER (OUTPATIENT)
Age: 58
Discharge: HOME | End: 2023-09-19
Payer: COMMERCIAL

## 2023-09-19 PROCEDURE — 36415 COLL VENOUS BLD VENIPUNCTURE: CPT

## 2023-09-19 PROCEDURE — 83036 HEMOGLOBIN GLYCOSYLATED A1C: CPT

## 2023-09-20 LAB
EST. AVERAGE GLUCOSE BLD GHB EST-MCNC: 91 MG/DL
HBA1C MFR BLD: 4.8 % (ref 4–6)

## 2023-09-22 ENCOUNTER — TELEPHONE (OUTPATIENT)
Dept: PRIMARY CARE CLINIC | Age: 58
End: 2023-09-22

## 2023-09-22 NOTE — TELEPHONE ENCOUNTER
Pt called stating he sent in a verification form to be signed by PCP and send to his employer to return to work after hospital but have not heard anything back yet. Writer explained that PCP was out of the office this week and just return so will let PCP know about the form.      Fax number: [phone]

## 2023-10-12 ENCOUNTER — HOSPITAL ENCOUNTER (OUTPATIENT)
Dept: PAIN MANAGEMENT | Age: 58
Discharge: HOME | End: 2023-10-12
Payer: COMMERCIAL

## 2023-10-12 VITALS — OXYGEN SATURATION: 97 % | HEART RATE: 63 BPM | BODY MASS INDEX: 23.03 KG/M2 | WEIGHT: 170 LBS | HEIGHT: 72 IN

## 2023-10-12 DIAGNOSIS — M54.16 LUMBAR RADICULOPATHY: Primary | Chronic | ICD-10-CM

## 2023-10-12 DIAGNOSIS — M47.817 LUMBOSACRAL SPONDYLOSIS WITHOUT MYELOPATHY: Chronic | ICD-10-CM

## 2023-10-12 DIAGNOSIS — M51.26 LUMBAR DISC HERNIATION: Chronic | ICD-10-CM

## 2023-10-12 DIAGNOSIS — M47.816 FACET HYPERTROPHY OF LUMBAR REGION: Chronic | ICD-10-CM

## 2023-10-12 DIAGNOSIS — Z98.1 HISTORY OF LUMBAR FUSION: ICD-10-CM

## 2023-10-12 DIAGNOSIS — G89.29 CHRONIC BILATERAL LOW BACK PAIN WITH RIGHT-SIDED SCIATICA: ICD-10-CM

## 2023-10-12 DIAGNOSIS — M54.41 CHRONIC BILATERAL LOW BACK PAIN WITH RIGHT-SIDED SCIATICA: ICD-10-CM

## 2023-10-12 PROCEDURE — 99213 OFFICE O/P EST LOW 20 MIN: CPT

## 2023-10-12 PROCEDURE — 99214 OFFICE O/P EST MOD 30 MIN: CPT | Performed by: NURSE PRACTITIONER

## 2023-10-12 ASSESSMENT — ENCOUNTER SYMPTOMS
SHORTNESS OF BREATH: 0
BACK PAIN: 1
COUGH: 0
CONSTIPATION: 0

## 2023-10-12 NOTE — PROGRESS NOTES
(Chronic)    Relevant Orders    MRI LUMBAR SPINE W WO CONTRAST    History of lumbar fusion    Relevant Orders    MRI LUMBAR SPINE W WO CONTRAST    Chronic bilateral low back pain with right-sided sciatica          Treatment Plan:  Patient relates no significant relief from recent intervention   Lumbar  MRI ordered today to further evaluate pathology and guide treatment plan. Consider intervention and/or surgical consult based on findings  Follow up appointment made for 4 weeks    I have reviewed the chief complaint and history of present illness (including ROS and PFSH) and vital documentation by my staff and I agree with their documentation and have added where applicable.

## 2023-10-24 ENCOUNTER — HOSPITAL ENCOUNTER (OUTPATIENT)
Dept: MRI IMAGING | Facility: CLINIC | Age: 58
Discharge: HOME | End: 2023-10-26
Payer: COMMERCIAL

## 2023-10-24 ENCOUNTER — HOSPITAL ENCOUNTER (OUTPATIENT)
Dept: MRI IMAGING | Age: 58
Discharge: HOME | End: 2023-10-26
Payer: COMMERCIAL

## 2023-10-24 DIAGNOSIS — Z98.1 HISTORY OF LUMBAR FUSION: ICD-10-CM

## 2023-10-24 DIAGNOSIS — M47.816 FACET HYPERTROPHY OF LUMBAR REGION: Chronic | ICD-10-CM

## 2023-10-24 DIAGNOSIS — M47.817 LUMBOSACRAL SPONDYLOSIS WITHOUT MYELOPATHY: Chronic | ICD-10-CM

## 2023-10-24 DIAGNOSIS — M54.16 LUMBAR RADICULOPATHY: Chronic | ICD-10-CM

## 2023-10-24 DIAGNOSIS — M51.26 LUMBAR DISC HERNIATION: Chronic | ICD-10-CM

## 2023-10-24 PROCEDURE — A9579 GAD-BASE MR CONTRAST NOS,1ML: HCPCS

## 2023-10-24 PROCEDURE — 2580000003 HC RX 258

## 2023-10-24 PROCEDURE — 6360000004 HC RX CONTRAST MEDICATION

## 2023-10-24 PROCEDURE — 72148 MRI LUMBAR SPINE W/O DYE: CPT

## 2023-10-24 RX ORDER — SODIUM CHLORIDE 0.9 % (FLUSH) 0.9 %
10 SYRINGE (ML) INJECTION PRN
Status: COMPLETED | OUTPATIENT
Start: 2023-10-24 | End: 2023-10-24

## 2023-10-24 RX ADMIN — GADOTERIDOL 15 ML: 279.3 INJECTION, SOLUTION INTRAVENOUS at 14:53

## 2023-10-24 RX ADMIN — SODIUM CHLORIDE, PRESERVATIVE FREE 10 ML: 5 INJECTION INTRAVENOUS at 14:53

## 2023-11-14 ENCOUNTER — HOSPITAL ENCOUNTER (OUTPATIENT)
Dept: PAIN MANAGEMENT | Age: 58
Discharge: HOME | End: 2023-11-14
Payer: COMMERCIAL

## 2023-11-14 VITALS — TEMPERATURE: 97.3 F | WEIGHT: 170 LBS | BODY MASS INDEX: 23.03 KG/M2 | HEIGHT: 72 IN

## 2023-11-14 DIAGNOSIS — M47.817 LUMBOSACRAL SPONDYLOSIS WITHOUT MYELOPATHY: Chronic | ICD-10-CM

## 2023-11-14 DIAGNOSIS — M54.16 LUMBAR RADICULOPATHY: Primary | Chronic | ICD-10-CM

## 2023-11-14 PROCEDURE — 99213 OFFICE O/P EST LOW 20 MIN: CPT

## 2023-11-14 PROCEDURE — 99214 OFFICE O/P EST MOD 30 MIN: CPT | Performed by: NURSE PRACTITIONER

## 2023-11-14 ASSESSMENT — ENCOUNTER SYMPTOMS
SHORTNESS OF BREATH: 0
COUGH: 0
BOWEL INCONTINENCE: 0
BACK PAIN: 1
CONSTIPATION: 0

## 2023-11-14 ASSESSMENT — PAIN SCALES - GENERAL: PAINLEVEL_OUTOF10: 6

## 2024-09-16 ASSESSMENT — PATIENT HEALTH QUESTIONNAIRE - PHQ9
9. THOUGHTS THAT YOU WOULD BE BETTER OFF DEAD, OR OF HURTING YOURSELF: NOT AT ALL
SUM OF ALL RESPONSES TO PHQ QUESTIONS 1-9: 7
SUM OF ALL RESPONSES TO PHQ9 QUESTIONS 1 & 2: 0
9. THOUGHTS THAT YOU WOULD BE BETTER OFF DEAD, OR OF HURTING YOURSELF: NOT AT ALL
7. TROUBLE CONCENTRATING ON THINGS, SUCH AS READING THE NEWSPAPER OR WATCHING TELEVISION: NOT AT ALL
8. MOVING OR SPEAKING SO SLOWLY THAT OTHER PEOPLE COULD HAVE NOTICED. OR THE OPPOSITE, BEING SO FIGETY OR RESTLESS THAT YOU HAVE BEEN MOVING AROUND A LOT MORE THAN USUAL: NOT AT ALL
5. POOR APPETITE OR OVEREATING: MORE THAN HALF THE DAYS
3. TROUBLE FALLING OR STAYING ASLEEP: NOT AT ALL
8. MOVING OR SPEAKING SO SLOWLY THAT OTHER PEOPLE COULD HAVE NOTICED. OR THE OPPOSITE - BEING SO FIDGETY OR RESTLESS THAT YOU HAVE BEEN MOVING AROUND A LOT MORE THAN USUAL: NOT AT ALL
SUM OF ALL RESPONSES TO PHQ QUESTIONS 1-9: 7
6. FEELING BAD ABOUT YOURSELF - OR THAT YOU ARE A FAILURE OR HAVE LET YOURSELF OR YOUR FAMILY DOWN: NEARLY EVERY DAY
1. LITTLE INTEREST OR PLEASURE IN DOING THINGS: NOT AT ALL
5. POOR APPETITE OR OVEREATING: MORE THAN HALF THE DAYS
1. LITTLE INTEREST OR PLEASURE IN DOING THINGS: NOT AT ALL
2. FEELING DOWN, DEPRESSED OR HOPELESS: NOT AT ALL
4. FEELING TIRED OR HAVING LITTLE ENERGY: MORE THAN HALF THE DAYS
6. FEELING BAD ABOUT YOURSELF - OR THAT YOU ARE A FAILURE OR HAVE LET YOURSELF OR YOUR FAMILY DOWN: NEARLY EVERY DAY
10. IF YOU CHECKED OFF ANY PROBLEMS, HOW DIFFICULT HAVE THESE PROBLEMS MADE IT FOR YOU TO DO YOUR WORK, TAKE CARE OF THINGS AT HOME, OR GET ALONG WITH OTHER PEOPLE: SOMEWHAT DIFFICULT
7. TROUBLE CONCENTRATING ON THINGS, SUCH AS READING THE NEWSPAPER OR WATCHING TELEVISION: NOT AT ALL
3. TROUBLE FALLING OR STAYING ASLEEP: NOT AT ALL
10. IF YOU CHECKED OFF ANY PROBLEMS, HOW DIFFICULT HAVE THESE PROBLEMS MADE IT FOR YOU TO DO YOUR WORK, TAKE CARE OF THINGS AT HOME, OR GET ALONG WITH OTHER PEOPLE: SOMEWHAT DIFFICULT
2. FEELING DOWN, DEPRESSED OR HOPELESS: NOT AT ALL
SUM OF ALL RESPONSES TO PHQ QUESTIONS 1-9: 7
4. FEELING TIRED OR HAVING LITTLE ENERGY: MORE THAN HALF THE DAYS

## 2024-09-19 ENCOUNTER — OFFICE VISIT (OUTPATIENT)
Dept: PRIMARY CARE CLINIC | Age: 59
End: 2024-09-19

## 2024-09-19 VITALS
HEART RATE: 77 BPM | SYSTOLIC BLOOD PRESSURE: 122 MMHG | OXYGEN SATURATION: 96 % | HEIGHT: 72 IN | WEIGHT: 183.6 LBS | DIASTOLIC BLOOD PRESSURE: 72 MMHG | BODY MASS INDEX: 24.87 KG/M2 | RESPIRATION RATE: 16 BRPM

## 2024-09-19 DIAGNOSIS — Z12.5 SCREENING PSA (PROSTATE SPECIFIC ANTIGEN): ICD-10-CM

## 2024-09-19 DIAGNOSIS — Z23 NEED FOR INFLUENZA VACCINATION: ICD-10-CM

## 2024-09-19 DIAGNOSIS — Z13.220 ENCOUNTER FOR LIPID SCREENING FOR CARDIOVASCULAR DISEASE: ICD-10-CM

## 2024-09-19 DIAGNOSIS — Z00.00 ANNUAL PHYSICAL EXAM: Primary | ICD-10-CM

## 2024-09-19 DIAGNOSIS — Z13.6 ENCOUNTER FOR LIPID SCREENING FOR CARDIOVASCULAR DISEASE: ICD-10-CM

## 2024-09-19 SDOH — ECONOMIC STABILITY: INCOME INSECURITY: HOW HARD IS IT FOR YOU TO PAY FOR THE VERY BASICS LIKE FOOD, HOUSING, MEDICAL CARE, AND HEATING?: NOT HARD AT ALL

## 2024-09-19 SDOH — ECONOMIC STABILITY: FOOD INSECURITY: WITHIN THE PAST 12 MONTHS, YOU WORRIED THAT YOUR FOOD WOULD RUN OUT BEFORE YOU GOT MONEY TO BUY MORE.: NEVER TRUE

## 2024-09-19 SDOH — ECONOMIC STABILITY: FOOD INSECURITY: WITHIN THE PAST 12 MONTHS, THE FOOD YOU BOUGHT JUST DIDN'T LAST AND YOU DIDN'T HAVE MONEY TO GET MORE.: NEVER TRUE

## 2024-09-19 ASSESSMENT — ENCOUNTER SYMPTOMS
VOMITING: 0
ABDOMINAL PAIN: 0
COUGH: 0
EYE PAIN: 0
RHINORRHEA: 0
BACK PAIN: 0
CONSTIPATION: 0
SINUS PAIN: 0
NAUSEA: 0
SHORTNESS OF BREATH: 0
DIARRHEA: 0

## 2024-09-20 ENCOUNTER — HOSPITAL ENCOUNTER (OUTPATIENT)
Age: 59
Discharge: HOME | End: 2024-09-20
Payer: COMMERCIAL

## 2024-09-20 DIAGNOSIS — Z13.6 ENCOUNTER FOR LIPID SCREENING FOR CARDIOVASCULAR DISEASE: ICD-10-CM

## 2024-09-20 DIAGNOSIS — Z13.220 ENCOUNTER FOR LIPID SCREENING FOR CARDIOVASCULAR DISEASE: ICD-10-CM

## 2024-09-20 DIAGNOSIS — Z00.00 ANNUAL PHYSICAL EXAM: ICD-10-CM

## 2024-09-20 DIAGNOSIS — Z12.5 SCREENING PSA (PROSTATE SPECIFIC ANTIGEN): ICD-10-CM

## 2024-09-20 LAB
ALBUMIN SERPL-MCNC: 4.2 G/DL (ref 3.5–5.2)
ALP SERPL-CCNC: 40 U/L (ref 40–129)
ALT SERPL-CCNC: 11 U/L (ref 5–41)
ANION GAP SERPL CALCULATED.3IONS-SCNC: 9 MMOL/L (ref 9–17)
AST SERPL-CCNC: 14 U/L
BASOPHILS # BLD: 0 K/UL (ref 0–0.2)
BASOPHILS NFR BLD: 1 % (ref 0–2)
BILIRUB SERPL-MCNC: 0.6 MG/DL (ref 0.3–1.2)
BUN SERPL-MCNC: 16 MG/DL (ref 6–20)
CALCIUM SERPL-MCNC: 9 MG/DL (ref 8.6–10.4)
CHLORIDE SERPL-SCNC: 104 MMOL/L (ref 98–107)
CHOLEST SERPL-MCNC: 198 MG/DL
CHOLESTEROL/HDL RATIO: 3.6
CO2 SERPL-SCNC: 27 MMOL/L (ref 20–31)
CREAT SERPL-MCNC: 0.9 MG/DL (ref 0.7–1.2)
EOSINOPHIL # BLD: 0.1 K/UL (ref 0–0.4)
EOSINOPHILS RELATIVE PERCENT: 1 % (ref 0–4)
ERYTHROCYTE [DISTWIDTH] IN BLOOD BY AUTOMATED COUNT: 12.9 % (ref 11.5–14.9)
GFR, ESTIMATED: >90 ML/MIN/1.73M2
GLUCOSE P FAST SERPL-MCNC: 108 MG/DL (ref 70–99)
HCT VFR BLD AUTO: 44.4 % (ref 41–53)
HDLC SERPL-MCNC: 55 MG/DL
HGB BLD-MCNC: 15.4 G/DL (ref 13.5–17.5)
LDLC SERPL CALC-MCNC: 121 MG/DL (ref 0–130)
LYMPHOCYTES NFR BLD: 2 K/UL (ref 1–4.8)
LYMPHOCYTES RELATIVE PERCENT: 30 % (ref 24–44)
MCH RBC QN AUTO: 34.5 PG (ref 26–34)
MCHC RBC AUTO-ENTMCNC: 34.7 G/DL (ref 31–37)
MCV RBC AUTO: 99.5 FL (ref 80–100)
MONOCYTES NFR BLD: 0.4 K/UL (ref 0.1–1.3)
MONOCYTES NFR BLD: 7 % (ref 1–7)
NEUTROPHILS NFR BLD: 61 % (ref 36–66)
NEUTS SEG NFR BLD: 4 K/UL (ref 1.3–9.1)
PLATELET # BLD AUTO: 260 K/UL (ref 150–450)
PMV BLD AUTO: 7.2 FL (ref 6–12)
POTASSIUM SERPL-SCNC: 4.1 MMOL/L (ref 3.7–5.3)
PROT SERPL-MCNC: 6.8 G/DL (ref 6.4–8.3)
PSA SERPL-MCNC: 0.5 NG/ML (ref 0–4)
RBC # BLD AUTO: 4.46 M/UL (ref 4.5–5.9)
SODIUM SERPL-SCNC: 140 MMOL/L (ref 135–144)
TRIGL SERPL-MCNC: 109 MG/DL
WBC OTHER # BLD: 6.5 K/UL (ref 3.5–11)

## 2024-09-20 PROCEDURE — 85025 COMPLETE CBC W/AUTO DIFF WBC: CPT

## 2024-09-20 PROCEDURE — 80061 LIPID PANEL: CPT

## 2024-09-20 PROCEDURE — 36415 COLL VENOUS BLD VENIPUNCTURE: CPT

## 2024-09-20 PROCEDURE — G0103 PSA SCREENING: HCPCS

## 2024-09-20 PROCEDURE — 80053 COMPREHEN METABOLIC PANEL: CPT

## 2024-09-26 ENCOUNTER — PATIENT MESSAGE (OUTPATIENT)
Dept: PRIMARY CARE CLINIC | Age: 59
End: 2024-09-26

## 2024-10-10 ENCOUNTER — HOSPITAL ENCOUNTER (OUTPATIENT)
Dept: PAIN MANAGEMENT | Age: 59
Discharge: HOME | End: 2024-10-10
Payer: COMMERCIAL

## 2024-10-10 VITALS — WEIGHT: 183 LBS | HEIGHT: 72 IN | BODY MASS INDEX: 24.79 KG/M2

## 2024-10-10 DIAGNOSIS — Z98.1 HISTORY OF LUMBAR FUSION: ICD-10-CM

## 2024-10-10 DIAGNOSIS — M54.16 LUMBAR RADICULOPATHY: Primary | Chronic | ICD-10-CM

## 2024-10-10 DIAGNOSIS — M47.817 LUMBOSACRAL SPONDYLOSIS WITHOUT MYELOPATHY: Chronic | ICD-10-CM

## 2024-10-10 PROCEDURE — 99213 OFFICE O/P EST LOW 20 MIN: CPT

## 2024-10-10 PROCEDURE — 99214 OFFICE O/P EST MOD 30 MIN: CPT | Performed by: ANESTHESIOLOGY

## 2024-10-10 ASSESSMENT — PAIN DESCRIPTION - DESCRIPTORS: DESCRIPTORS: ACHING

## 2024-10-10 ASSESSMENT — PAIN DESCRIPTION - FREQUENCY: FREQUENCY: CONTINUOUS

## 2024-10-10 ASSESSMENT — PAIN DESCRIPTION - PAIN TYPE: TYPE: CHRONIC PAIN

## 2024-10-10 ASSESSMENT — ENCOUNTER SYMPTOMS
CONSTIPATION: 0
VOMITING: 0
RESPIRATORY NEGATIVE: 1
BACK PAIN: 1
DIARRHEA: 0
CHEST TIGHTNESS: 0
SORE THROAT: 0
NAUSEA: 0
GASTROINTESTINAL NEGATIVE: 1
SHORTNESS OF BREATH: 0

## 2024-10-10 ASSESSMENT — PAIN SCALES - GENERAL: PAINLEVEL_OUTOF10: 4

## 2024-10-10 ASSESSMENT — PAIN DESCRIPTION - LOCATION: LOCATION: BACK

## 2024-10-10 ASSESSMENT — PAIN DESCRIPTION - ORIENTATION: ORIENTATION: LOWER

## 2024-10-10 NOTE — PROGRESS NOTES
The patient is a 58 y.o.Non- / non  male.    Chief Complaint   Patient presents with    Back Pain      -58-year-old pleasant man who was seen in our clinic several months back  He is seen for history of chronic low back pain  Today Tyler presents with a flareup of lower back pain located in the lower lumbar area with radiation down right leg over hip gluteal region posterior thigh  Describes it as aching throbbing burning sensation going on for 3 months without any particular injury  Past history significant for previous lumbar spine fusion surgery  Had an epidural injection 2 years ago that provided 75 to 80% relief for more than 1 year  Denies any focal weakness or bladder bowel changes    Patient have done multiple courses of physical therapy have tried NSAIDs  Pain is affecting quality of life  Last diagnostic workup with MRI lumbar spine in 2023  Clinical presentation suggest flareup of chronic lumbar radicular pain  Discussed repeating lumbar epidural steroid injection  Patient is interested    May consider for repeat lumbar RFA for axial back pain in future if indicated    Patient is here today for: LBP, Discuss Injections   Pain level: 4   Character: tight, throbbing   Radiating: right thigh   Weakness or numbness: weakness from the pain   Aggravating Factors: nothing, it always hurts   Alleviating Factors: ice   Constant or intermitting: constant   Bladder/bowel loss: no           Past Medical History:   Diagnosis Date    Arthritis         Past Surgical History:   Procedure Laterality Date    ANTERIOR CRUCIATE LIGAMENT REPAIR Right     PAIN MANAGEMENT PROCEDURE  10/2022    test block series    SPINAL FUSION         Social History     Socioeconomic History    Marital status:      Spouse name: None    Number of children: None    Years of education: None    Highest education level: None   Tobacco Use    Smoking status: Former     Average packs/day: 1 pack/day for 81.0 years (81.0 ttl

## 2024-11-06 ENCOUNTER — HOSPITAL ENCOUNTER (OUTPATIENT)
Dept: PAIN MANAGEMENT | Facility: CLINIC | Age: 59
Discharge: HOME | End: 2024-11-06
Payer: COMMERCIAL

## 2024-11-06 VITALS
HEART RATE: 65 BPM | DIASTOLIC BLOOD PRESSURE: 87 MMHG | TEMPERATURE: 97.9 F | OXYGEN SATURATION: 96 % | RESPIRATION RATE: 18 BRPM | SYSTOLIC BLOOD PRESSURE: 128 MMHG | BODY MASS INDEX: 24.79 KG/M2 | HEIGHT: 72 IN | WEIGHT: 183 LBS

## 2024-11-06 DIAGNOSIS — M54.16 LUMBAR RADICULOPATHY: Primary | Chronic | ICD-10-CM

## 2024-11-06 DIAGNOSIS — G89.29 CHRONIC BILATERAL LOW BACK PAIN WITH RIGHT-SIDED SCIATICA: ICD-10-CM

## 2024-11-06 DIAGNOSIS — M54.41 CHRONIC BILATERAL LOW BACK PAIN WITH RIGHT-SIDED SCIATICA: ICD-10-CM

## 2024-11-06 DIAGNOSIS — R52 PAIN MANAGEMENT: ICD-10-CM

## 2024-11-06 PROCEDURE — 64483 NJX AA&/STRD TFRM EPI L/S 1: CPT | Performed by: ANESTHESIOLOGY

## 2024-11-06 PROCEDURE — 99152 MOD SED SAME PHYS/QHP 5/>YRS: CPT | Performed by: ANESTHESIOLOGY

## 2024-11-06 PROCEDURE — 64484 NJX AA&/STRD TFRM EPI L/S EA: CPT

## 2024-11-06 PROCEDURE — 6360000002 HC RX W HCPCS: Performed by: ANESTHESIOLOGY

## 2024-11-06 PROCEDURE — 64484 NJX AA&/STRD TFRM EPI L/S EA: CPT | Performed by: ANESTHESIOLOGY

## 2024-11-06 PROCEDURE — 2580000003 HC RX 258: Performed by: ANESTHESIOLOGY

## 2024-11-06 PROCEDURE — 6360000004 HC RX CONTRAST MEDICATION: Performed by: ANESTHESIOLOGY

## 2024-11-06 PROCEDURE — 64483 NJX AA&/STRD TFRM EPI L/S 1: CPT

## 2024-11-06 PROCEDURE — 2500000003 HC RX 250 WO HCPCS: Performed by: ANESTHESIOLOGY

## 2024-11-06 RX ORDER — SODIUM CHLORIDE 0.9 % (FLUSH) 0.9 %
SYRINGE (ML) INJECTION
Status: COMPLETED | OUTPATIENT
Start: 2024-11-06 | End: 2024-11-06

## 2024-11-06 RX ORDER — LIDOCAINE HYDROCHLORIDE 10 MG/ML
INJECTION, SOLUTION EPIDURAL; INFILTRATION; INTRACAUDAL; PERINEURAL
Status: COMPLETED | OUTPATIENT
Start: 2024-11-06 | End: 2024-11-06

## 2024-11-06 RX ORDER — DEXAMETHASONE SODIUM PHOSPHATE 10 MG/ML
INJECTION, SOLUTION INTRAMUSCULAR; INTRAVENOUS
Status: COMPLETED | OUTPATIENT
Start: 2024-11-06 | End: 2024-11-06

## 2024-11-06 RX ORDER — FENTANYL CITRATE 50 UG/ML
INJECTION, SOLUTION INTRAMUSCULAR; INTRAVENOUS
Status: COMPLETED | OUTPATIENT
Start: 2024-11-06 | End: 2024-11-06

## 2024-11-06 RX ORDER — MIDAZOLAM HYDROCHLORIDE 2 MG/2ML
INJECTION, SOLUTION INTRAMUSCULAR; INTRAVENOUS
Status: COMPLETED | OUTPATIENT
Start: 2024-11-06 | End: 2024-11-06

## 2024-11-06 RX ADMIN — MIDAZOLAM HYDROCHLORIDE 2 MG: 1 INJECTION, SOLUTION INTRAMUSCULAR; INTRAVENOUS at 08:13

## 2024-11-06 RX ADMIN — LIDOCAINE HYDROCHLORIDE 3 ML: 10 INJECTION, SOLUTION EPIDURAL; INFILTRATION; INTRACAUDAL at 08:13

## 2024-11-06 RX ADMIN — DEXAMETHASONE SODIUM PHOSPHATE 10 MG: 10 INJECTION, SOLUTION INTRAMUSCULAR; INTRAVENOUS at 08:18

## 2024-11-06 RX ADMIN — Medication 5 ML: at 08:18

## 2024-11-06 RX ADMIN — FENTANYL CITRATE 50 MCG: 50 INJECTION, SOLUTION INTRAMUSCULAR; INTRAVENOUS at 08:13

## 2024-11-06 RX ADMIN — IOHEXOL 3 ML: 180 INJECTION INTRAVENOUS at 08:16

## 2024-11-06 ASSESSMENT — PAIN DESCRIPTION - DESCRIPTORS: DESCRIPTORS: PRESSURE;STABBING

## 2024-11-06 ASSESSMENT — PAIN DESCRIPTION - ORIENTATION: ORIENTATION: LOWER

## 2024-11-06 ASSESSMENT — PAIN DESCRIPTION - FREQUENCY: FREQUENCY: CONTINUOUS

## 2024-11-06 ASSESSMENT — PAIN - FUNCTIONAL ASSESSMENT
PAIN_FUNCTIONAL_ASSESSMENT: 0-10
PAIN_FUNCTIONAL_ASSESSMENT: PREVENTS OR INTERFERES WITH ALL ACTIVE AND SOME PASSIVE ACTIVITIES

## 2024-11-06 ASSESSMENT — PAIN DESCRIPTION - LOCATION: LOCATION: BACK

## 2024-11-06 ASSESSMENT — PAIN SCALES - GENERAL: PAINLEVEL_OUTOF10: 7

## 2024-11-06 ASSESSMENT — PAIN DESCRIPTION - ONSET: ONSET: AWAKENED FROM SLEEP

## 2024-11-06 ASSESSMENT — PAIN DESCRIPTION - DIRECTION: RADIATING_TOWARDS: RIGHT LEG

## 2024-11-06 ASSESSMENT — PAIN DESCRIPTION - PAIN TYPE: TYPE: CHRONIC PAIN

## 2024-11-06 NOTE — DISCHARGE INSTRUCTIONS

## 2024-11-06 NOTE — OP NOTE
Patient Name: Issac Rosas   YOB: 1965  Room/Bed: Room/bed info not found  Medical Record Number: [id number]  Date: 11/6/2024       Preoperative Diagnosis:    1. Lumbar radiculopathy    2. Chronic bilateral low back pain with right-sided sciatica          Postoperative diagnosis:   1. Lumbar radiculopathy    2. Chronic bilateral low back pain with right-sided sciatica          Blood Loss: none    Procedure Performed:  Transforaminal lumbar epidural steroid injection at the levels of L5 and S1 on the Right side under fluoroscopic guidance.    Procedure:   The Patient was seen in the preop area, chart was reviewed, informed consent was obtained. Patient was taken to procedure room and was placed in prone position. Vital signs were monitored through out the  Procedure. A time out was completed.  The skin over the back was prepped and draped in sterile manner.     The target point was marked in ipsilateral obliques just below the pedicle at the target levels. Skin and deep tissues were anesthetized with 1 % lidocaine.A 20-gauge, spinal needle was advanced  under fluoroscopy guidance in AP / Oblique and lateral views, such that the tip of the needle lies in the neuroforamina at about the 6 o'clock position under the pedicle of the target vertebra.  This was confirmed with AP and lateral views of the fluoroscopy.     Then after negative aspiration contrast dye Omnipaque-180 was injected with live fluoroscopy in AP views that showed  spread of the contrast in the epidural space  and no vascular runoff or intrathecal spread. Finally 3 ml of treatment solution containing 5 ml of  PF NS mixed with 1 ml of dexamethasone 10 mg / ml was injected.  The needle was removed and a Band-Aid was placed over the needle  insertion site.  The patient's vital signs remained stable and the patient tolerated the procedure well.        The same procedure was then repeated at right at L 5level with same technique. The

## 2024-11-06 NOTE — INTERVAL H&P NOTE
Update History & Physical    The patient's History and Physical of October 10, 2024 was reviewed with the patient and I examined the patient. There was no change. The surgical site was confirmed by the patient and me.     Plan: The risks, benefits, expected outcome, and alternative to the recommended procedure have been discussed with the patient. Patient understands and wants to proceed with the procedure.     ASA 2  MP2    Electronically signed by Dominik Vallecillo MD on 11/6/2024 at 8:08 AM

## 2024-12-05 ENCOUNTER — HOSPITAL ENCOUNTER (OUTPATIENT)
Dept: PAIN MANAGEMENT | Age: 59
Discharge: HOME | End: 2024-12-05
Payer: COMMERCIAL

## 2024-12-05 VITALS — WEIGHT: 175 LBS | HEIGHT: 72 IN | BODY MASS INDEX: 23.7 KG/M2

## 2024-12-05 DIAGNOSIS — Z98.1 HISTORY OF LUMBAR FUSION: ICD-10-CM

## 2024-12-05 DIAGNOSIS — M54.16 LUMBAR RADICULOPATHY: Chronic | ICD-10-CM

## 2024-12-05 DIAGNOSIS — M47.817 LUMBOSACRAL SPONDYLOSIS WITHOUT MYELOPATHY: Primary | Chronic | ICD-10-CM

## 2024-12-05 PROCEDURE — 99213 OFFICE O/P EST LOW 20 MIN: CPT | Performed by: ANESTHESIOLOGY

## 2024-12-05 PROCEDURE — 99213 OFFICE O/P EST LOW 20 MIN: CPT

## 2024-12-05 ASSESSMENT — PAIN SCALES - GENERAL: PAINLEVEL_OUTOF10: 6

## 2024-12-05 ASSESSMENT — ENCOUNTER SYMPTOMS
EYES NEGATIVE: 1
BACK PAIN: 1
RESPIRATORY NEGATIVE: 1

## 2024-12-05 NOTE — PROGRESS NOTES
The patient is a 59 y.o.Non- / non  male.    Chief Complaint   Patient presents with    Back Pain        HPI  This is a pleasant 59-year-old gentleman with history of chronic low back and leg pain  Back pain is constant aching nagging stiffness in the lower lumbar area describes the pain as throbbing  Aggravates with routine activity interfere with quality of life  Patient failed conservative measures with NSAIDs and physical therapy  Had diagnosis of lumbar spondylosis and facet mediated pain  Underwent lumbar medial branch nerve radiofrequency ablation at L4-5 and L5-S1 facet with fluoroscopy guidance on September 13, 2023  This procedure provided him more than 50% improvement with improved range of motion and activity tolerance, relief lasted for more than 1 year  Pain is now returned back to the baseline  He is interested in repeating the procedure  Clinical presentation suggest recurrence of facet mediated pain  Oswestry disability index show moderate disability associated with pain  Doing home exercises    Also had sciatica symptom on last visit for which we did an epidural injection and did not respond much well with the transforaminal approach  May consider for interlaminar approach in the future if sciatica becomes a major issue    Pain score 6/10  Hemoglobin A1C   Date Value Ref Range Status   09/19/2023 4.8 4.0 - 6.0 % Final         Past Medical History:   Diagnosis Date    Arthritis         Past Surgical History:   Procedure Laterality Date    ANTERIOR CRUCIATE LIGAMENT REPAIR Right     PAIN MANAGEMENT PROCEDURE  10/2022    test block series    SPINAL FUSION         Social History     Socioeconomic History    Marital status:      Spouse name: None    Number of children: None    Years of education: None    Highest education level: None   Tobacco Use    Smoking status: Former     Average packs/day: 1 pack/day for 81.0 years (81.0 ttl pk-yrs)     Types: Cigarettes     Start date: 1980

## 2025-01-08 ENCOUNTER — HOSPITAL ENCOUNTER (OUTPATIENT)
Dept: PAIN MANAGEMENT | Facility: CLINIC | Age: 60
Discharge: HOME | End: 2025-01-08
Payer: COMMERCIAL

## 2025-01-08 VITALS
OXYGEN SATURATION: 98 % | SYSTOLIC BLOOD PRESSURE: 145 MMHG | BODY MASS INDEX: 25.2 KG/M2 | WEIGHT: 180 LBS | TEMPERATURE: 97.4 F | RESPIRATION RATE: 12 BRPM | DIASTOLIC BLOOD PRESSURE: 81 MMHG | HEIGHT: 71 IN | HEART RATE: 78 BPM

## 2025-01-08 DIAGNOSIS — M47.817 LUMBOSACRAL SPONDYLOSIS WITHOUT MYELOPATHY: Primary | Chronic | ICD-10-CM

## 2025-01-08 DIAGNOSIS — R52 PAIN MANAGEMENT: ICD-10-CM

## 2025-01-08 PROCEDURE — 6360000002 HC RX W HCPCS: Performed by: ANESTHESIOLOGY

## 2025-01-08 PROCEDURE — 64635 DESTROY LUMB/SAC FACET JNT: CPT

## 2025-01-08 PROCEDURE — 64636 DESTROY L/S FACET JNT ADDL: CPT | Performed by: ANESTHESIOLOGY

## 2025-01-08 PROCEDURE — 64636 DESTROY L/S FACET JNT ADDL: CPT

## 2025-01-08 PROCEDURE — 99152 MOD SED SAME PHYS/QHP 5/>YRS: CPT | Performed by: ANESTHESIOLOGY

## 2025-01-08 PROCEDURE — 64635 DESTROY LUMB/SAC FACET JNT: CPT | Performed by: ANESTHESIOLOGY

## 2025-01-08 RX ORDER — LIDOCAINE HYDROCHLORIDE 10 MG/ML
INJECTION, SOLUTION EPIDURAL; INFILTRATION; INTRACAUDAL; PERINEURAL
Status: COMPLETED | OUTPATIENT
Start: 2025-01-08 | End: 2025-01-08

## 2025-01-08 RX ORDER — LIDOCAINE HYDROCHLORIDE 40 MG/ML
INJECTION, SOLUTION RETROBULBAR
Status: COMPLETED | OUTPATIENT
Start: 2025-01-08 | End: 2025-01-08

## 2025-01-08 RX ORDER — FENTANYL CITRATE 50 UG/ML
INJECTION, SOLUTION INTRAMUSCULAR; INTRAVENOUS
Status: COMPLETED | OUTPATIENT
Start: 2025-01-08 | End: 2025-01-08

## 2025-01-08 RX ORDER — MIDAZOLAM HYDROCHLORIDE 2 MG/2ML
INJECTION, SOLUTION INTRAMUSCULAR; INTRAVENOUS
Status: COMPLETED | OUTPATIENT
Start: 2025-01-08 | End: 2025-01-08

## 2025-01-08 RX ADMIN — LIDOCAINE HYDROCHLORIDE 5 ML: 10 INJECTION, SOLUTION EPIDURAL; INFILTRATION; INTRACAUDAL at 13:10

## 2025-01-08 RX ADMIN — LIDOCAINE HYDROCHLORIDE 5 ML: 10 INJECTION, SOLUTION EPIDURAL; INFILTRATION; INTRACAUDAL at 13:13

## 2025-01-08 RX ADMIN — MIDAZOLAM HYDROCHLORIDE 2 MG: 1 INJECTION, SOLUTION INTRAMUSCULAR; INTRAVENOUS at 13:09

## 2025-01-08 RX ADMIN — FENTANYL CITRATE 50 MCG: 50 INJECTION, SOLUTION INTRAMUSCULAR; INTRAVENOUS at 13:09

## 2025-01-08 RX ADMIN — LIDOCAINE HYDROCHLORIDE 5 ML: 40 SOLUTION RETROBULBAR; TOPICAL at 13:10

## 2025-01-08 ASSESSMENT — PAIN SCALES - GENERAL: PAINLEVEL_OUTOF10: 0

## 2025-01-08 ASSESSMENT — PAIN DESCRIPTION - DESCRIPTORS: DESCRIPTORS: BURNING;SHARP;THROBBING

## 2025-01-08 NOTE — DISCHARGE INSTRUCTIONS
Discharge Instructions following Sedation or Anesthesia:  You have  received  a sedative/anesthetic therefore, you should not consume any alcoholic beverages for minimum of 12 hours.  Do not drive or operate machinery for 24 hours.  Do not sign legal documents for 24 hours.  Dizziness, drowsiness, and unsteadiness may occur.  Rest when need to.  Increase diet as tolerated.  Keep up on fluids if diet allows.      General Instructions:  Do not take a tub bath for 72 hours after procedure (this includes hot tubs and swimming pools).  You may shower, but avoid hot water to injection site.   Avoid strenuous activity TODAY especially if you experience dizziness.   Remove band-aid the next day.  Wash off any residual iodine   Do not use heat, heating pad, or any other heating device over the injection site for 3 days after the procedure.  If you experience pain after your procedure, you may continue with your current pain medication as prescribed.  (DO NOT INCREASE YOUR PAIN MEDICATION WITHOUT TALKING TO DOCTOR)  Soreness and pain at injection site is common, may use ice to reduce soreness.    Call [facility] at [phone] if you experience:   Fever, chills or temperature over 100    Vomiting, Headache, persistent stiff neck, nausea, blurred vision   Difficulty in urinating or unable to urinate with 8 hours   Increase in weakness, numbness or loss of function   Increased redness, swelling or drainage at the injection site

## 2025-01-08 NOTE — H&P
Pain Pre-Op H&P Note    Dominik Vallecillo MD    HPI: Issac Rosas  presents with   Back pain is constant aching nagging stiffness in the lower lumbar area describes the pain as throbbing  Aggravates with routine activity interfere with quality of life  Patient failed conservative measures with NSAIDs and physical therapy  Had diagnosis of lumbar spondylosis and facet mediated pain  Underwent lumbar medial branch nerve radiofrequency ablation at L4-5 and L5-S1 facet with fluoroscopy guidance on September 13, 2023    Past Medical History:   Diagnosis Date    Arthritis        Past Surgical History:   Procedure Laterality Date    ANTERIOR CRUCIATE LIGAMENT REPAIR Right     PAIN MANAGEMENT PROCEDURE  10/2022    test block series    SPINAL FUSION         History reviewed. No pertinent family history.    Allergies   Allergen Reactions    Adhesive Tape Rash         Current Outpatient Medications:     acetaminophen (TYLENOL) 500 MG tablet, Take 2 tablets by mouth every 6 hours as needed for Pain, Disp: 80 tablet, Rfl: 0    ibuprofen (ADVIL;MOTRIN) 800 MG tablet, Take 1 tablet by mouth every 8 hours as needed for Pain, Disp: 30 tablet, Rfl: 0    Social History     Tobacco Use    Smoking status: Former     Average packs/day: 1 pack/day for 81.0 years (81.0 ttl pk-yrs)     Types: Cigarettes     Start date: 1980    Smokeless tobacco: Never   Substance Use Topics    Alcohol use: Yes     Comment: occasional        Review of Systems:   Focused review of systems was performed, and negative as pertinent to diagnosis, except as stated in HPI.      Physical Exam  Constitutional:       Appearance: Normal appearance.   Pulmonary:      Effort: Pulmonary effort is normal.   Neurological:      Mental Status: alert.   Psychiatric:         Attention and Perception: Attention and perception normal.         Mood and Affect: Mood and affect normal.   Cardiovascular:      Rate: Normal rate.         ASA: 2          Mallampati: 2